# Patient Record
Sex: FEMALE | Race: WHITE | Employment: OTHER | URBAN - METROPOLITAN AREA
[De-identification: names, ages, dates, MRNs, and addresses within clinical notes are randomized per-mention and may not be internally consistent; named-entity substitution may affect disease eponyms.]

---

## 2018-08-14 ENCOUNTER — OFFICE VISIT (OUTPATIENT)
Dept: FAMILY MEDICINE CLINIC | Age: 48
End: 2018-08-14
Payer: MEDICARE

## 2018-08-14 ENCOUNTER — HOSPITAL ENCOUNTER (OUTPATIENT)
Age: 48
Setting detail: SPECIMEN
Discharge: HOME | End: 2018-08-14
Payer: MEDICARE

## 2018-08-14 VITALS
DIASTOLIC BLOOD PRESSURE: 82 MMHG | RESPIRATION RATE: 16 BRPM | HEIGHT: 64 IN | TEMPERATURE: 97.8 F | WEIGHT: 117 LBS | HEART RATE: 70 BPM | SYSTOLIC BLOOD PRESSURE: 128 MMHG | BODY MASS INDEX: 19.97 KG/M2

## 2018-08-14 DIAGNOSIS — Z23 NEED FOR PNEUMOCOCCAL VACCINATION: ICD-10-CM

## 2018-08-14 DIAGNOSIS — F17.200 TOBACCO USE DISORDER: ICD-10-CM

## 2018-08-14 DIAGNOSIS — Z11.4 SCREENING FOR HIV (HUMAN IMMUNODEFICIENCY VIRUS): ICD-10-CM

## 2018-08-14 DIAGNOSIS — G90.A POTS (POSTURAL ORTHOSTATIC TACHYCARDIA SYNDROME): Primary | ICD-10-CM

## 2018-08-14 DIAGNOSIS — Z12.31 ENCOUNTER FOR SCREENING MAMMOGRAM FOR BREAST CANCER: ICD-10-CM

## 2018-08-14 DIAGNOSIS — M25.50 POLYARTHRALGIA: ICD-10-CM

## 2018-08-14 DIAGNOSIS — Z76.89 ENCOUNTER TO ESTABLISH CARE: ICD-10-CM

## 2018-08-14 DIAGNOSIS — G90.A POTS (POSTURAL ORTHOSTATIC TACHYCARDIA SYNDROME): ICD-10-CM

## 2018-08-14 DIAGNOSIS — Z90.710 HISTORY OF HYSTERECTOMY FOR BENIGN DISEASE: ICD-10-CM

## 2018-08-14 DIAGNOSIS — G43.109 OCULAR MIGRAINE: ICD-10-CM

## 2018-08-14 DIAGNOSIS — Z82.49 FAMILY HISTORY OF HEART DISEASE: ICD-10-CM

## 2018-08-14 LAB
ALBUMIN SERPL-MCNC: 4.4 G/DL (ref 3.5–5.2)
ALBUMIN/GLOBULIN RATIO: 1.7 (ref 1–2.5)
ALP BLD-CCNC: 69 U/L (ref 35–104)
ALT SERPL-CCNC: 11 U/L (ref 5–33)
ANION GAP SERPL CALCULATED.3IONS-SCNC: 13 MMOL/L (ref 9–17)
AST SERPL-CCNC: 17 U/L
BILIRUB SERPL-MCNC: 0.53 MG/DL (ref 0.3–1.2)
BUN BLDV-MCNC: 9 MG/DL (ref 6–20)
BUN/CREAT BLD: NORMAL (ref 9–20)
C-REACTIVE PROTEIN: <0.3 MG/L (ref 0–5)
CALCIUM SERPL-MCNC: 9.1 MG/DL (ref 8.6–10.4)
CHLORIDE BLD-SCNC: 104 MMOL/L (ref 98–107)
CHOLESTEROL/HDL RATIO: 2.1
CHOLESTEROL: 187 MG/DL
CO2: 26 MMOL/L (ref 20–31)
CREAT SERPL-MCNC: 0.56 MG/DL (ref 0.5–0.9)
GFR AFRICAN AMERICAN: >60 ML/MIN
GFR NON-AFRICAN AMERICAN: >60 ML/MIN
GFR SERPL CREATININE-BSD FRML MDRD: NORMAL ML/MIN/{1.73_M2}
GFR SERPL CREATININE-BSD FRML MDRD: NORMAL ML/MIN/{1.73_M2}
GLUCOSE FASTING: 79 MG/DL (ref 70–99)
HCT VFR BLD CALC: 46.2 % (ref 36.3–47.1)
HDLC SERPL-MCNC: 88 MG/DL
HEMOGLOBIN: 14.6 G/DL (ref 11.9–15.1)
HIV AG/AB: NONREACTIVE
LDL CHOLESTEROL: 83 MG/DL (ref 0–130)
MCH RBC QN AUTO: 31.7 PG (ref 25.2–33.5)
MCHC RBC AUTO-ENTMCNC: 31.6 G/DL (ref 28.4–34.8)
MCV RBC AUTO: 100.2 FL (ref 82.6–102.9)
NRBC AUTOMATED: 0 PER 100 WBC
PDW BLD-RTO: 13.4 % (ref 11.8–14.4)
PLATELET # BLD: 349 K/UL (ref 138–453)
PMV BLD AUTO: 10.3 FL (ref 8.1–13.5)
POTASSIUM SERPL-SCNC: 3.9 MMOL/L (ref 3.7–5.3)
RBC # BLD: 4.61 M/UL (ref 3.95–5.11)
RHEUMATOID FACTOR: <10 IU/ML
SODIUM BLD-SCNC: 143 MMOL/L (ref 135–144)
TOTAL PROTEIN: 7 G/DL (ref 6.4–8.3)
TRIGL SERPL-MCNC: 81 MG/DL
VLDLC SERPL CALC-MCNC: NORMAL MG/DL (ref 1–30)
WBC # BLD: 8.4 K/UL (ref 3.5–11.3)

## 2018-08-14 PROCEDURE — 90471 IMMUNIZATION ADMIN: CPT | Performed by: NURSE PRACTITIONER

## 2018-08-14 PROCEDURE — G8420 CALC BMI NORM PARAMETERS: HCPCS | Performed by: NURSE PRACTITIONER

## 2018-08-14 PROCEDURE — 90732 PPSV23 VACC 2 YRS+ SUBQ/IM: CPT | Performed by: NURSE PRACTITIONER

## 2018-08-14 PROCEDURE — 4004F PT TOBACCO SCREEN RCVD TLK: CPT | Performed by: NURSE PRACTITIONER

## 2018-08-14 PROCEDURE — 99204 OFFICE O/P NEW MOD 45 MIN: CPT | Performed by: NURSE PRACTITIONER

## 2018-08-14 PROCEDURE — G8427 DOCREV CUR MEDS BY ELIG CLIN: HCPCS | Performed by: NURSE PRACTITIONER

## 2018-08-14 RX ORDER — FLUDROCORTISONE ACETATE 0.1 MG/1
0.1 TABLET ORAL DAILY
Qty: 30 TABLET | Refills: 3 | Status: SHIPPED | OUTPATIENT
Start: 2018-08-14 | End: 2018-10-31 | Stop reason: RX

## 2018-08-14 ASSESSMENT — ENCOUNTER SYMPTOMS
BACK PAIN: 1
ABDOMINAL DISTENTION: 0
WHEEZING: 0
PHOTOPHOBIA: 0
DIARRHEA: 0
RHINORRHEA: 0
VOMITING: 0
SHORTNESS OF BREATH: 0
CHEST TIGHTNESS: 0
ABDOMINAL PAIN: 0
CONSTIPATION: 0
NAUSEA: 0
BLOOD IN STOOL: 0
COUGH: 0
SORE THROAT: 0
EYE PAIN: 0

## 2018-08-14 ASSESSMENT — PATIENT HEALTH QUESTIONNAIRE - PHQ9
SUM OF ALL RESPONSES TO PHQ9 QUESTIONS 1 & 2: 0
2. FEELING DOWN, DEPRESSED OR HOPELESS: 0
1. LITTLE INTEREST OR PLEASURE IN DOING THINGS: 0
SUM OF ALL RESPONSES TO PHQ QUESTIONS 1-9: 0
SUM OF ALL RESPONSES TO PHQ QUESTIONS 1-9: 0

## 2018-08-14 NOTE — PROGRESS NOTES
Uvula is midline and oropharynx is clear and moist. No oropharyngeal exudate or posterior oropharyngeal erythema. Eyes: Pupils are equal, round, and reactive to light. Conjunctivae, EOM and lids are normal. Right eye exhibits no discharge. Left eye exhibits no discharge. No scleral icterus. Neck: Trachea normal and normal range of motion. Neck supple. No neck rigidity. No tracheal deviation, no erythema and normal range of motion present. No thyromegaly present. Cardiovascular: Normal rate, regular rhythm, normal heart sounds and intact distal pulses. No murmur heard. Pulses:       Carotid pulses are 2+ on the right side, and 2+ on the left side. Radial pulses are 2+ on the right side, and 2+ on the left side. Posterior tibial pulses are 2+ on the right side, and 2+ on the left side. Pulmonary/Chest: Effort normal and breath sounds normal. No accessory muscle usage or stridor. No respiratory distress. She has no decreased breath sounds. She has no wheezes. She has no rhonchi. She has no rales. She exhibits no tenderness. Abdominal: Soft. Normal appearance and bowel sounds are normal. She exhibits no distension. There is no tenderness. There is no rigidity, no rebound, no guarding and no CVA tenderness. Musculoskeletal: She exhibits no edema. Right shoulder: She exhibits tenderness. Left shoulder: She exhibits tenderness. Right foot: There is tenderness. Left foot: There is tenderness. Lymphadenopathy:     She has no cervical adenopathy. Neurological: She is alert and oriented to person, place, and time. She has normal strength and normal reflexes. No cranial nerve deficit or sensory deficit. She exhibits normal muscle tone. She displays no seizure activity. Coordination and gait normal. GCS eye subscore is 4. GCS verbal subscore is 5. GCS motor subscore is 6. Skin: Skin is warm, dry and intact. No rash noted. She is not diaphoretic. No erythema. providers. Encouraged healthy diet and daily exercise. Recommend biannual dental visits. Return to office in 1 month for reevaluation and review of test results      Return in about 4 weeks (around 9/11/2018), or if symptoms worsen or fail to improve, for Routine follow up. Rayna received counseling on the following healthy behaviors: nutrition, exercise, medication adherence, tobacco cessation and decrease in alcohol consumption  Reviewed prior labs and health maintenance  Continue current medications, diet and exercise. Discussed use, benefit, and side effects of prescribed medications. Barriers to medication compliance addressed. Patient given educational materials - see patient instructions  Was a self-tracking handout given in paper form or via [employer]? No:     Requested Prescriptions     Signed Prescriptions Disp Refills    fludrocortisone (FLORINEF) 0.1 MG tablet 30 tablet 3     Sig: Take 1 tablet by mouth daily       All patient questions answered. Patient voiced understanding. Quality Measures    Body mass index is 20.08 kg/m². Normal. Weight control planned discussed Healthy diet and regular exercise. BP: 128/82 Blood pressure is normal. Treatment plan consists of No treatment change needed.     No results found for: LDLCALC, LDLCHOLESTEROL, LDLDIRECT (goal LDL reduction with dx if diabetes is 50% LDL reduction)      PHQ Scores 8/14/2018   PHQ2 Score 0   PHQ9 Score 0     Interpretation of Total Score Depression Severity: 1-4 = Minimal depression, 5-9 = Mild depression, 10-14 = Moderate depression, 15-19 = Moderately severe depression, 20-27 = Severe depression          Electronically signed by GERARDO Nelson CNP on 8/14/2018 at 9:09 AM

## 2018-08-14 NOTE — PATIENT INSTRUCTIONS
precancerous polyp removed from your colon or whether your parent, brother, sister, or child has had colon cancer. For women  · Breast exam and mammogram. Talk to your doctor about when you should have a clinical breast exam and a mammogram. Medical experts differ on whether and how often women under 50 should have these tests. Your doctor can help you decide what is right for you. · Pap test and pelvic exam. Begin Pap tests at age 24. A Pap test is the best way to find cervical cancer. The test often is part of a pelvic exam. Ask how often to have this test.  · Tests for sexually transmitted infections (STIs). Ask whether you should have tests for STIs. You may be at risk if you have sex with more than one person, especially if your partners do not wear condoms. For men  · Tests for sexually transmitted infections (STIs). Ask whether you should have tests for STIs. You may be at risk if you have sex with more than one person, especially if you do not wear a condom. · Testicular cancer exam. Ask your doctor whether you should check your testicles regularly. · Prostate exam. Talk to your doctor about whether you should have a blood test (called a PSA test) for prostate cancer. Experts differ on whether and when men should have this test. Some experts suggest it if you are older than 39 and are -American or have a father or brother who got prostate cancer when he was younger than 72. When should you call for help? Watch closely for changes in your health, and be sure to contact your doctor if you have any problems or symptoms that concern you. Where can you learn more? Go to [email or web address] and sign in to your [employer] account. Enter P072 in the ARPU box to learn more about \"Well Visit, Ages 25 to 48: Care Instructions. \"     If you do not have an account, please click on the \"Sign Up Now\" link. Current as of:  May 16, 2017  Content Version: 11.7  © 1349-1282

## 2018-08-15 LAB — ANTI-NUCLEAR ANTIBODY (ANA): NEGATIVE

## 2018-09-10 DIAGNOSIS — G90.A POTS (POSTURAL ORTHOSTATIC TACHYCARDIA SYNDROME): ICD-10-CM

## 2018-09-10 RX ORDER — FLUDROCORTISONE ACETATE 0.1 MG/1
0.1 TABLET ORAL DAILY
Qty: 30 TABLET | Refills: 3 | OUTPATIENT
Start: 2018-09-10 | End: 2019-09-10

## 2018-09-10 NOTE — TELEPHONE ENCOUNTER
From: Josephine Bob  Sent: 9/8/2018 12:17 PM EDT  Subject: Medication Renewal Request    Josephine Bob would like a refill of the following medications:     fludrocortisone (FLORINEF) 0.1 MG tablet Kinjal Wasserman, GERARDO - CNP]    Preferred pharmacy: [facility], [address] [address] [phone] -  [phone]

## 2018-09-12 ENCOUNTER — HOSPITAL ENCOUNTER (OUTPATIENT)
Dept: MAMMOGRAPHY | Age: 48
Discharge: HOME | End: 2018-09-14
Payer: MEDICARE

## 2018-09-12 DIAGNOSIS — Z12.31 ENCOUNTER FOR SCREENING MAMMOGRAM FOR BREAST CANCER: ICD-10-CM

## 2018-09-12 PROCEDURE — 77067 SCR MAMMO BI INCL CAD: CPT

## 2018-09-13 DIAGNOSIS — R92.8 ABNORMAL MAMMOGRAM: Primary | ICD-10-CM

## 2018-09-27 ENCOUNTER — HOSPITAL ENCOUNTER (OUTPATIENT)
Dept: MAMMOGRAPHY | Age: 48
Discharge: HOME | End: 2018-09-29
Payer: MEDICARE

## 2018-09-27 ENCOUNTER — HOSPITAL ENCOUNTER (OUTPATIENT)
Dept: ULTRASOUND IMAGING | Age: 48
Discharge: HOME | End: 2018-09-29
Payer: MEDICARE

## 2018-09-27 DIAGNOSIS — R92.8 ABNORMAL MAMMOGRAM: ICD-10-CM

## 2018-09-27 PROCEDURE — G0279 TOMOSYNTHESIS, MAMMO: HCPCS

## 2018-09-27 PROCEDURE — 76642 ULTRASOUND BREAST LIMITED: CPT

## 2018-09-28 DIAGNOSIS — R92.8 ABNORMAL MAMMOGRAM: Primary | ICD-10-CM

## 2018-10-15 ENCOUNTER — TELEPHONE (OUTPATIENT)
Dept: FAMILY MEDICINE CLINIC | Age: 48
End: 2018-10-15

## 2018-10-15 DIAGNOSIS — F17.200 TOBACCO USE DISORDER: Primary | ICD-10-CM

## 2018-10-15 RX ORDER — VARENICLINE TARTRATE 25 MG
KIT ORAL
Qty: 42 TABLET | Refills: 0 | Status: SHIPPED | OUTPATIENT
Start: 2018-10-15 | End: 2018-11-09 | Stop reason: SDUPTHER

## 2018-10-17 ENCOUNTER — PATIENT MESSAGE (OUTPATIENT)
Dept: FAMILY MEDICINE CLINIC | Age: 48
End: 2018-10-17

## 2018-10-18 ENCOUNTER — PATIENT MESSAGE (OUTPATIENT)
Dept: FAMILY MEDICINE CLINIC | Age: 48
End: 2018-10-18

## 2018-10-18 DIAGNOSIS — G90.A POTS (POSTURAL ORTHOSTATIC TACHYCARDIA SYNDROME): Primary | ICD-10-CM

## 2018-10-19 ENCOUNTER — PATIENT MESSAGE (OUTPATIENT)
Dept: FAMILY MEDICINE CLINIC | Age: 48
End: 2018-10-19

## 2018-10-19 DIAGNOSIS — G90.A POTS (POSTURAL ORTHOSTATIC TACHYCARDIA SYNDROME): Primary | ICD-10-CM

## 2018-10-19 DIAGNOSIS — G43.109 OCULAR MIGRAINE: ICD-10-CM

## 2018-10-19 RX ORDER — METOPROLOL SUCCINATE 25 MG/1
25 TABLET, EXTENDED RELEASE ORAL DAILY
Qty: 30 TABLET | Refills: 3 | Status: SHIPPED | OUTPATIENT
Start: 2018-10-19 | End: 2019-08-15 | Stop reason: ALTCHOICE

## 2018-10-31 ENCOUNTER — OFFICE VISIT (OUTPATIENT)
Dept: FAMILY MEDICINE CLINIC | Age: 48
End: 2018-10-31
Payer: MEDICARE

## 2018-10-31 VITALS
HEART RATE: 52 BPM | DIASTOLIC BLOOD PRESSURE: 82 MMHG | WEIGHT: 116.9 LBS | SYSTOLIC BLOOD PRESSURE: 124 MMHG | RESPIRATION RATE: 20 BRPM | TEMPERATURE: 97.8 F | BODY MASS INDEX: 20.07 KG/M2

## 2018-10-31 DIAGNOSIS — J06.9 UPPER RESPIRATORY TRACT INFECTION, UNSPECIFIED TYPE: Primary | ICD-10-CM

## 2018-10-31 DIAGNOSIS — F17.210 CIGARETTE NICOTINE DEPENDENCE WITHOUT COMPLICATION: ICD-10-CM

## 2018-10-31 PROCEDURE — G8427 DOCREV CUR MEDS BY ELIG CLIN: HCPCS | Performed by: NURSE PRACTITIONER

## 2018-10-31 PROCEDURE — 4004F PT TOBACCO SCREEN RCVD TLK: CPT | Performed by: NURSE PRACTITIONER

## 2018-10-31 PROCEDURE — G8484 FLU IMMUNIZE NO ADMIN: HCPCS | Performed by: NURSE PRACTITIONER

## 2018-10-31 PROCEDURE — 99213 OFFICE O/P EST LOW 20 MIN: CPT | Performed by: NURSE PRACTITIONER

## 2018-10-31 PROCEDURE — G8420 CALC BMI NORM PARAMETERS: HCPCS | Performed by: NURSE PRACTITIONER

## 2018-10-31 RX ORDER — METHYLPREDNISOLONE 4 MG/1
4 TABLET ORAL
Refills: 0 | COMMUNITY
Start: 2018-08-01 | End: 2018-10-31 | Stop reason: ALTCHOICE

## 2018-10-31 RX ORDER — DOXYCYCLINE HYCLATE 100 MG
100 TABLET ORAL 2 TIMES DAILY
Qty: 20 TABLET | Refills: 0 | Status: SHIPPED | OUTPATIENT
Start: 2018-10-31 | End: 2018-11-10

## 2018-10-31 ASSESSMENT — ENCOUNTER SYMPTOMS
SHORTNESS OF BREATH: 0
COUGH: 1
EYE DISCHARGE: 0
EYE REDNESS: 0
ABDOMINAL PAIN: 0
EYE ITCHING: 0
CONSTIPATION: 0
SORE THROAT: 0
NAUSEA: 0
RHINORRHEA: 1
DIARRHEA: 0

## 2018-11-09 DIAGNOSIS — F17.200 TOBACCO USE DISORDER: Primary | ICD-10-CM

## 2018-11-09 RX ORDER — VARENICLINE TARTRATE 25 MG
KIT ORAL
Qty: 42 TABLET | Refills: 0 | Status: CANCELLED | OUTPATIENT
Start: 2018-11-09 | End: 2019-11-09

## 2018-11-09 RX ORDER — VARENICLINE TARTRATE 1 MG/1
1 TABLET, FILM COATED ORAL 2 TIMES DAILY
Qty: 60 TABLET | Refills: 1 | Status: SHIPPED | OUTPATIENT
Start: 2018-11-09 | End: 2019-04-04

## 2019-02-05 ENCOUNTER — HOSPITAL ENCOUNTER (OUTPATIENT)
Dept: GENERAL RADIOLOGY | Facility: CLINIC | Age: 49
Discharge: HOME | End: 2019-02-07
Payer: MEDICARE

## 2019-02-05 ENCOUNTER — HOSPITAL ENCOUNTER (OUTPATIENT)
Facility: CLINIC | Age: 49
Discharge: HOME | End: 2019-02-07
Payer: MEDICARE

## 2019-02-05 ENCOUNTER — OFFICE VISIT (OUTPATIENT)
Dept: FAMILY MEDICINE CLINIC | Age: 49
End: 2019-02-05
Payer: MEDICARE

## 2019-02-05 VITALS
RESPIRATION RATE: 16 BRPM | DIASTOLIC BLOOD PRESSURE: 76 MMHG | SYSTOLIC BLOOD PRESSURE: 122 MMHG | HEART RATE: 68 BPM | WEIGHT: 124 LBS | TEMPERATURE: 98.9 F | BODY MASS INDEX: 21.28 KG/M2

## 2019-02-05 DIAGNOSIS — M62.838 SPASM OF CERVICAL PARASPINOUS MUSCLE: ICD-10-CM

## 2019-02-05 DIAGNOSIS — R20.0 NUMBNESS: ICD-10-CM

## 2019-02-05 DIAGNOSIS — Z23 NEED FOR TDAP VACCINATION: ICD-10-CM

## 2019-02-05 DIAGNOSIS — M62.838 SPASM OF CERVICAL PARASPINOUS MUSCLE: Primary | ICD-10-CM

## 2019-02-05 PROCEDURE — G8427 DOCREV CUR MEDS BY ELIG CLIN: HCPCS | Performed by: NURSE PRACTITIONER

## 2019-02-05 PROCEDURE — 99213 OFFICE O/P EST LOW 20 MIN: CPT | Performed by: NURSE PRACTITIONER

## 2019-02-05 PROCEDURE — G8420 CALC BMI NORM PARAMETERS: HCPCS | Performed by: NURSE PRACTITIONER

## 2019-02-05 PROCEDURE — 90715 TDAP VACCINE 7 YRS/> IM: CPT | Performed by: NURSE PRACTITIONER

## 2019-02-05 PROCEDURE — 72040 X-RAY EXAM NECK SPINE 2-3 VW: CPT

## 2019-02-05 PROCEDURE — 90471 IMMUNIZATION ADMIN: CPT | Performed by: NURSE PRACTITIONER

## 2019-02-05 PROCEDURE — 4004F PT TOBACCO SCREEN RCVD TLK: CPT | Performed by: NURSE PRACTITIONER

## 2019-02-05 PROCEDURE — G8484 FLU IMMUNIZE NO ADMIN: HCPCS | Performed by: NURSE PRACTITIONER

## 2019-02-05 RX ORDER — TIZANIDINE 4 MG/1
4 TABLET ORAL 3 TIMES DAILY PRN
Qty: 30 TABLET | Refills: 0 | Status: SHIPPED | OUTPATIENT
Start: 2019-02-05 | End: 2019-02-18 | Stop reason: ALTCHOICE

## 2019-02-05 RX ORDER — NAPROXEN 500 MG/1
500 TABLET ORAL 2 TIMES DAILY WITH MEALS
Qty: 60 TABLET | Refills: 3 | Status: SHIPPED | OUTPATIENT
Start: 2019-02-05 | End: 2019-08-15 | Stop reason: ALTCHOICE

## 2019-02-05 ASSESSMENT — PATIENT HEALTH QUESTIONNAIRE - PHQ9
SUM OF ALL RESPONSES TO PHQ QUESTIONS 1-9: 0
2. FEELING DOWN, DEPRESSED OR HOPELESS: 0
1. LITTLE INTEREST OR PLEASURE IN DOING THINGS: 0
SUM OF ALL RESPONSES TO PHQ9 QUESTIONS 1 & 2: 0
SUM OF ALL RESPONSES TO PHQ QUESTIONS 1-9: 0

## 2019-02-05 ASSESSMENT — ENCOUNTER SYMPTOMS
NAUSEA: 0
WHEEZING: 0
DIARRHEA: 0
SORE THROAT: 0
CONSTIPATION: 0
RESPIRATORY NEGATIVE: 1
CHEST TIGHTNESS: 0
COUGH: 0
GASTROINTESTINAL NEGATIVE: 1
RHINORRHEA: 0
BACK PAIN: 0
EYES NEGATIVE: 1
SHORTNESS OF BREATH: 0
ABDOMINAL PAIN: 0
VOMITING: 0

## 2019-02-18 ENCOUNTER — OFFICE VISIT (OUTPATIENT)
Dept: FAMILY MEDICINE CLINIC | Age: 49
End: 2019-02-18
Payer: MEDICARE

## 2019-02-18 VITALS
RESPIRATION RATE: 16 BRPM | BODY MASS INDEX: 20.6 KG/M2 | SYSTOLIC BLOOD PRESSURE: 120 MMHG | DIASTOLIC BLOOD PRESSURE: 72 MMHG | HEART RATE: 86 BPM | WEIGHT: 120 LBS | TEMPERATURE: 97.9 F

## 2019-02-18 DIAGNOSIS — M62.838 CERVICAL PARASPINAL MUSCLE SPASM: Primary | ICD-10-CM

## 2019-02-18 PROCEDURE — 4004F PT TOBACCO SCREEN RCVD TLK: CPT | Performed by: NURSE PRACTITIONER

## 2019-02-18 PROCEDURE — G8420 CALC BMI NORM PARAMETERS: HCPCS | Performed by: NURSE PRACTITIONER

## 2019-02-18 PROCEDURE — 99213 OFFICE O/P EST LOW 20 MIN: CPT | Performed by: NURSE PRACTITIONER

## 2019-02-18 PROCEDURE — G8484 FLU IMMUNIZE NO ADMIN: HCPCS | Performed by: NURSE PRACTITIONER

## 2019-02-18 PROCEDURE — G8427 DOCREV CUR MEDS BY ELIG CLIN: HCPCS | Performed by: NURSE PRACTITIONER

## 2019-02-18 RX ORDER — GABAPENTIN 100 MG/1
100 CAPSULE ORAL NIGHTLY
Qty: 30 CAPSULE | Refills: 0 | Status: SHIPPED | OUTPATIENT
Start: 2019-02-18 | End: 2019-06-12 | Stop reason: SDUPTHER

## 2019-02-18 RX ORDER — CYCLOBENZAPRINE HCL 5 MG
5-10 TABLET ORAL 2 TIMES DAILY PRN
Qty: 30 TABLET | Refills: 0 | Status: SHIPPED | OUTPATIENT
Start: 2019-02-18 | End: 2019-02-28

## 2019-02-18 ASSESSMENT — ENCOUNTER SYMPTOMS
ABDOMINAL PAIN: 0
BACK PAIN: 0
SORE THROAT: 0
DIARRHEA: 0
CONSTIPATION: 0
RHINORRHEA: 0
PHOTOPHOBIA: 0
BLOOD IN STOOL: 0
COUGH: 0
VOMITING: 0
ABDOMINAL DISTENTION: 0
CHEST TIGHTNESS: 0
WHEEZING: 0
SHORTNESS OF BREATH: 0
NAUSEA: 0
EYE PAIN: 0

## 2019-02-20 ENCOUNTER — HOSPITAL ENCOUNTER (OUTPATIENT)
Dept: PHYSICAL THERAPY | Facility: CLINIC | Age: 49
Setting detail: THERAPIES SERIES
Discharge: HOME | End: 2019-02-20
Payer: MEDICARE

## 2019-02-20 PROCEDURE — 97012 MECHANICAL TRACTION THERAPY: CPT

## 2019-02-20 PROCEDURE — 97110 THERAPEUTIC EXERCISES: CPT

## 2019-02-20 PROCEDURE — 97161 PT EVAL LOW COMPLEX 20 MIN: CPT

## 2019-02-21 ENCOUNTER — HOSPITAL ENCOUNTER (OUTPATIENT)
Dept: PHYSICAL THERAPY | Facility: CLINIC | Age: 49
Setting detail: THERAPIES SERIES
Discharge: HOME | End: 2019-02-21
Payer: MEDICARE

## 2019-02-21 PROCEDURE — 97012 MECHANICAL TRACTION THERAPY: CPT

## 2019-02-21 PROCEDURE — 97110 THERAPEUTIC EXERCISES: CPT

## 2019-02-27 ENCOUNTER — HOSPITAL ENCOUNTER (OUTPATIENT)
Dept: PHYSICAL THERAPY | Facility: CLINIC | Age: 49
Setting detail: THERAPIES SERIES
Discharge: HOME | End: 2019-02-27
Payer: MEDICARE

## 2019-02-27 PROCEDURE — 97140 MANUAL THERAPY 1/> REGIONS: CPT

## 2019-02-27 PROCEDURE — 97110 THERAPEUTIC EXERCISES: CPT

## 2019-02-27 PROCEDURE — 97012 MECHANICAL TRACTION THERAPY: CPT

## 2019-02-28 ENCOUNTER — HOSPITAL ENCOUNTER (OUTPATIENT)
Dept: PHYSICAL THERAPY | Facility: CLINIC | Age: 49
Setting detail: THERAPIES SERIES
Discharge: HOME | End: 2019-02-28
Payer: MEDICARE

## 2019-02-28 PROCEDURE — 97012 MECHANICAL TRACTION THERAPY: CPT

## 2019-02-28 PROCEDURE — 97110 THERAPEUTIC EXERCISES: CPT

## 2019-02-28 PROCEDURE — 97140 MANUAL THERAPY 1/> REGIONS: CPT

## 2019-03-06 ENCOUNTER — APPOINTMENT (OUTPATIENT)
Dept: PHYSICAL THERAPY | Facility: CLINIC | Age: 49
End: 2019-03-06
Payer: MEDICARE

## 2019-03-07 ENCOUNTER — HOSPITAL ENCOUNTER (OUTPATIENT)
Dept: PHYSICAL THERAPY | Facility: CLINIC | Age: 49
Setting detail: THERAPIES SERIES
Discharge: HOME | End: 2019-03-07
Payer: MEDICARE

## 2019-03-13 ENCOUNTER — HOSPITAL ENCOUNTER (OUTPATIENT)
Dept: PHYSICAL THERAPY | Facility: CLINIC | Age: 49
Setting detail: THERAPIES SERIES
Discharge: HOME | End: 2019-03-13
Payer: MEDICARE

## 2019-03-13 PROCEDURE — 97140 MANUAL THERAPY 1/> REGIONS: CPT

## 2019-03-13 PROCEDURE — 97012 MECHANICAL TRACTION THERAPY: CPT

## 2019-03-13 PROCEDURE — 97110 THERAPEUTIC EXERCISES: CPT

## 2019-03-15 ENCOUNTER — HOSPITAL ENCOUNTER (OUTPATIENT)
Dept: PHYSICAL THERAPY | Facility: CLINIC | Age: 49
Setting detail: THERAPIES SERIES
Discharge: HOME | End: 2019-03-15
Payer: MEDICARE

## 2019-03-15 PROCEDURE — 97012 MECHANICAL TRACTION THERAPY: CPT

## 2019-03-15 PROCEDURE — 97140 MANUAL THERAPY 1/> REGIONS: CPT

## 2019-03-15 PROCEDURE — 97032 APPL MODALITY 1+ESTIM EA 15: CPT

## 2019-03-15 PROCEDURE — G0283 ELEC STIM OTHER THAN WOUND: HCPCS

## 2019-03-15 PROCEDURE — 97110 THERAPEUTIC EXERCISES: CPT

## 2019-03-20 ENCOUNTER — HOSPITAL ENCOUNTER (OUTPATIENT)
Dept: PHYSICAL THERAPY | Facility: CLINIC | Age: 49
Setting detail: THERAPIES SERIES
Discharge: HOME | End: 2019-03-20
Payer: MEDICARE

## 2019-03-20 PROCEDURE — 97110 THERAPEUTIC EXERCISES: CPT

## 2019-03-20 PROCEDURE — 97012 MECHANICAL TRACTION THERAPY: CPT

## 2019-03-20 PROCEDURE — G0283 ELEC STIM OTHER THAN WOUND: HCPCS

## 2019-03-22 ENCOUNTER — TELEPHONE (OUTPATIENT)
Dept: FAMILY MEDICINE CLINIC | Age: 49
End: 2019-03-22

## 2019-03-22 ENCOUNTER — HOSPITAL ENCOUNTER (OUTPATIENT)
Dept: PHYSICAL THERAPY | Facility: CLINIC | Age: 49
Setting detail: THERAPIES SERIES
Discharge: HOME | End: 2019-03-22
Payer: MEDICARE

## 2019-03-22 DIAGNOSIS — M54.2 NECK PAIN: Primary | ICD-10-CM

## 2019-03-22 PROCEDURE — 97012 MECHANICAL TRACTION THERAPY: CPT

## 2019-03-22 PROCEDURE — 97110 THERAPEUTIC EXERCISES: CPT

## 2019-03-22 PROCEDURE — 97140 MANUAL THERAPY 1/> REGIONS: CPT

## 2019-03-27 ENCOUNTER — HOSPITAL ENCOUNTER (OUTPATIENT)
Dept: PHYSICAL THERAPY | Facility: CLINIC | Age: 49
Setting detail: THERAPIES SERIES
Discharge: HOME | End: 2019-03-27
Payer: MEDICARE

## 2019-03-29 ENCOUNTER — APPOINTMENT (OUTPATIENT)
Dept: PHYSICAL THERAPY | Facility: CLINIC | Age: 49
End: 2019-03-29
Payer: MEDICARE

## 2019-04-01 ENCOUNTER — HOSPITAL ENCOUNTER (OUTPATIENT)
Dept: MRI IMAGING | Facility: CLINIC | Age: 49
Discharge: HOME | End: 2019-04-03
Payer: MEDICARE

## 2019-04-01 DIAGNOSIS — M54.2 NECK PAIN: ICD-10-CM

## 2019-04-01 PROCEDURE — 72141 MRI NECK SPINE W/O DYE: CPT

## 2019-04-02 DIAGNOSIS — R93.7 ABNORMAL MRI, CERVICAL SPINE: Primary | ICD-10-CM

## 2019-04-04 ENCOUNTER — OFFICE VISIT (OUTPATIENT)
Dept: NEUROSURGERY | Age: 49
End: 2019-04-04
Payer: MEDICARE

## 2019-04-04 VITALS
WEIGHT: 119 LBS | HEART RATE: 78 BPM | BODY MASS INDEX: 19.83 KG/M2 | HEIGHT: 65 IN | SYSTOLIC BLOOD PRESSURE: 130 MMHG | DIASTOLIC BLOOD PRESSURE: 84 MMHG

## 2019-04-04 DIAGNOSIS — R20.2 NUMBNESS AND TINGLING IN RIGHT HAND: ICD-10-CM

## 2019-04-04 DIAGNOSIS — M47.22 CERVICAL SPONDYLOSIS WITH RADICULOPATHY: Primary | ICD-10-CM

## 2019-04-04 DIAGNOSIS — R20.0 NUMBNESS AND TINGLING IN RIGHT HAND: ICD-10-CM

## 2019-04-04 PROCEDURE — G8420 CALC BMI NORM PARAMETERS: HCPCS | Performed by: PHYSICIAN ASSISTANT

## 2019-04-04 PROCEDURE — 99242 OFF/OP CONSLTJ NEW/EST SF 20: CPT | Performed by: PHYSICIAN ASSISTANT

## 2019-04-04 PROCEDURE — G8427 DOCREV CUR MEDS BY ELIG CLIN: HCPCS | Performed by: PHYSICIAN ASSISTANT

## 2019-04-04 RX ORDER — CYCLOBENZAPRINE HCL 10 MG
5 TABLET ORAL 2 TIMES DAILY PRN
Status: ON HOLD | COMMUNITY
End: 2019-08-30 | Stop reason: HOSPADM

## 2019-04-04 RX ORDER — PREDNISONE 20 MG/1
TABLET ORAL
Qty: 18 TABLET | Refills: 0 | Status: SHIPPED | OUTPATIENT
Start: 2019-04-04 | End: 2019-08-15 | Stop reason: ALTCHOICE

## 2019-04-04 NOTE — PROGRESS NOTES
SECTION      x2    HYSTERECTOMY      ovaries remain     Family History   Problem Relation Age of Onset   Mitchell Carry Cancer Mother         brain    Hypertension Father     Cancer Maternal Grandmother     Heart Disease Maternal Grandfather      Current Outpatient Medications on File Prior to Visit   Medication Sig Dispense Refill    cyclobenzaprine (FLEXERIL) 10 MG tablet Take 10 mg by mouth 3 times daily as needed for Muscle spasms      naproxen (NAPROSYN) 500 MG tablet Take 1 tablet by mouth 2 times daily (with meals) 60 tablet 3    metoprolol succinate (TOPROL XL) 25 MG extended release tablet Take 1 tablet by mouth daily 30 tablet 3    gabapentin (NEURONTIN) 100 MG capsule Take 1 capsule by mouth nightly for 30 days. Intended supply: 30 days. 30 capsule 0     No current facility-administered medications on file prior to visit. Social History     Tobacco Use    Smoking status: Current Some Day Smoker     Packs/day: 1.00     Years: 30.00     Pack years: 30.00    Smokeless tobacco: Never Used   Substance Use Topics    Alcohol use: Yes     Alcohol/week: 7.2 oz     Types: 12 Standard drinks or equivalent per week     Comment: ocassional    Drug use: Yes     Types: Marijuana     Comment: ocassional     Allergies   Allergen Reactions    Diclofenac Swelling    Prozac [Fluoxetine Hcl]        Review of Systems  Constitutional: Negative for activity change and appetite change. HEENT: Negative for ear pain and facial swelling. Eyes: Negative for discharge and itching. Respiratory: Negative for choking and chest tightness. Cardiovascular: Negative for chest pain and leg swelling. Gastrointestinal: Negative for nausea and abdominal pain. Endocrine: Negative for cold intolerance and heat intolerance. Genitourinary: Negative for frequency and flank pain. Musculoskeletal: Negative for myalgias and joint swelling. Skin: Negative for rash and wound.    Allergic/Immunologic: Negative for environmental allergies and food allergies. Hematological: Negative for adenopathy. Does not bruise/bleed easily. Psychiatric/Behavioral: Negative for self-injury. The patient is not nervous/anxious. Physical Exam:      /84   Pulse 78   Ht 5' 5\" (1.651 m)   Wt 119 lb (54 kg)   BMI 19.80 kg/m²   Estimated body mass index is 19.8 kg/m² as calculated from the following:    Height as of this encounter: 5' 5\" (1.651 m). Weight as of this encounter: 119 lb (54 kg). General:  Larry Hutchinson is a 52y.o. year old female who appears her stated age. HEENT: Normocephalic atraumatic. Neck supple. Chest: Clear to auscultation bilaterally. Regular rate and rhythm. Abdomen: Soft nontender nondistended. Normoactive bowel sounds. Neurological Exam  Alert and oriented x 3. CN II-XII intact. Motor examination:   Tender to palpation over posterior cervical spine and paraspinal musculature. Tinel's negative bilaterally to elbow and wrist.  Strength in right upper extremity full and symmetric at 5/5 deltoid, triceps, biceps, wrist ext/flex, and +4/5 . Strength in left upper extremity full and symmetric at 5/5 deltoid, triceps, biceps, wrist ext/flex, and . Strength right lower extremity full and symmetric at 5/5 iliopsoas, quadriceps, hamstring, tibialis anterior, gastrocnemius, and EHL. Strength left lower extremity full and symmetric at 5/5 iliopsoas, quadriceps, hamstring, tibialis anterior, gastrocnemius, and EHL. Sensory: Grossly intact. Reflexes: +2/4 in bilateral upper extremities. +2/4 in bilateral patellar and achilles. Hoffmans negative, no clonus. Gait: Normal, narrow based.     Studies Review:     Reviewed MRI Type:  Film and Report    Images:  Narrative   EXAMINATION:   MRI OF THE CERVICAL SPINE WITHOUT CONTRAST 4/1/2019 10:50 am       TECHNIQUE:   Multiplanar multisequence MRI of the cervical spine was performed without the   administration of intravenous contrast.       COMPARISON: None.       HISTORY:   ORDERING SYSTEM PROVIDED HISTORY: Neck pain   TECHNOLOGIST PROVIDED HISTORY:   neck pain-failed PT   Ordering Physician Provided Reason for Exam: neck pain failed pt   Acuity: Unknown   Type of Exam: Initial   Additional signs and symptoms: neck pain down right arm, worse when sleeping       FINDINGS:   BONES/ALIGNMENT: There is straightening of the cervical spine.  The vertebral   body heights are maintained.  There is age-appropriate bone marrow signal.   There is degenerative endplate change at the E6-0 level.  There is multilevel   degenerative disc disease evidenced by loss of disc signal.  There is disc   space narrowing most pronounced at the C5-6 and C6-7 levels.  There is no   spondylolisthesis.       SPINAL CORD: The spinal cord is normal in caliber and signal without acute or   chronic compression.  The visualized intracranial structures are unremarkable.       SOFT TISSUES: Posterior paraspinal soft tissues are unremarkable.  The   visualized prevertebral soft tissues are unremarkable.       C2-C3: There is a disc osteophyte complex with uncovertebral and facet   hypertrophy.  There is no canal stenosis or left foraminal narrowing.  There   is moderate right foraminal narrowing.       C3-C4: There is a disc osteophyte complex with uncovertebral and facet   hypertrophy.  There is no canal stenosis.  There is moderate to severe left   and severe right foraminal narrowing.       C4-C5: There is a disc osteophyte complex with uncovertebral and facet   hypertrophy.  There is no canal stenosis or significant right foraminal   narrowing.  There is moderate left foraminal narrowing.       C5-C6: There is a disc osteophyte complex with uncovertebral and facet   hypertrophy.  There is canal stenosis measuring 6 mm in AP dimension.  There   is severe bilateral foraminal narrowing.       C6-C7: There is a disc osteophyte complex with uncovertebral and facet   hypertrophy.  There is canal stenosis measuring 8 mm in AP dimension.  There   is severe bilateral foraminal narrowing.       C7-T1: There is no disc bulge or herniation.  There is no canal stenosis or   foraminal narrowing.           Impression   Multilevel degenerative disc disease with uncovertebral and facet   hypertrophy.  There is canal stenosis at C5-6 and C6-7.       Moderate right foraminal narrowing at C2-3, moderate to severe left and   severe right foraminal narrowing at C3-4, moderate left foraminal narrowing   at C4-5, severe bilateral foraminal narrowing at C5-6, as well as severe   bilateral foraminal narrowing at C6-7. Assessment and Plan:     Edel Manley was seen today for consultation, neck pain, arm pain and numbness. Diagnoses and all orders for this visit:    Cervical spondylosis with radiculopathy  -     predniSONE (DELTASONE) 20 MG tablet; Take 3 tabs daily x 3 days, take 2 tabs daily x 3 days, then take 1 tab daily x 3 days  -     EMG; Future    Numbness and tingling in right hand  -     predniSONE (DELTASONE) 20 MG tablet; Take 3 tabs daily x 3 days, take 2 tabs daily x 3 days, then take 1 tab daily x 3 days  -     EMG; Future      Plan:   Ms. Marin Batista is a 52 y.o. female on whom a neurosurgical consultation has been requested for progressively worsening neck pain and neuropathic symptoms in bilateral upper extremities, R>L, since January. MRI cervical spine completed on 4/1/2019 reveals multilevel degenerative disc disease with uncovertebral and facet hypertrophy. There is significant central canal stenosis at level C5/6 and into a lesser degree at C6/7. There is multilevel bilateral neural foraminal stenosis which appears to be most severe at levels C5/6 and C6/7. Aside from a slightly decreased  strength of the right hand physical examination shows patient to be neurologically intact without focal deficit. Denies any symptoms of myelopathy including unsteady gait or loss of hand dexterity.   She is not hyperreflexic nor is her Marin sign positive. Recommendation would be to continue conservative treatment measures including therapy for which she has already been referred. EMG BUE is recommended to further evaluate and define neuropathic symptoms. Will do a 9 day steroid taper for purpose of decreasing inflammation and possibly providing relief of radicular symptoms of upper extremities. Diagnosis and plan discussed with the patient and all questions answered. Followup: Return for with Dr Rocio Melissa, cervical stenosis, s/p EMG. Prescriptions Ordered:  Orders Placed This Encounter   Medications    predniSONE (DELTASONE) 20 MG tablet     Sig: Take 3 tabs daily x 3 days, take 2 tabs daily x 3 days, then take 1 tab daily x 3 days     Dispense:  18 tablet     Refill:  0        Orders Placed:  Orders Placed This Encounter   Procedures    EMG     Standing Status:   Future     Number of Occurrences:   1     Standing Expiration Date:   7/3/2019     Order Specific Question:   Which body part? Answer:   Bilateral Upper Extremities       Electronically signed by MYRA Shah on 5/31/2019 at 2:18 PM    Please note that this chart was generated using voice recognition Dragon dictation software. Although every effort was made to ensure the accuracy of this automated transcription, some errors in transcription may have occurred.

## 2019-04-15 ENCOUNTER — TELEPHONE (OUTPATIENT)
Dept: NEUROSURGERY | Age: 49
End: 2019-04-15

## 2019-04-15 NOTE — TELEPHONE ENCOUNTER
Patient complaining of symptoms of numbness in lower back, right side; spasms lower back, right side; muscle cramping back right leg, behind knee; . (NO PAIN currently)  This has persisted for 7 days  This occurred bending over doing laundry  Are there any known injuries to the area unknown  Is this related to a sugery No  Pain scale 1-10: 0  Incontinence of bowel or bladder No  Radiation of pain down arm or leg No  Other symptoms such as fever or chills No  Did anything help with the pain: none, medications? Did anything make pain worse: ambulation; bending, twisting, reaching    Patient completed steroid taper from 4/4/19 visit.

## 2019-05-14 ENCOUNTER — HOSPITAL ENCOUNTER (OUTPATIENT)
Dept: NEUROLOGY | Age: 49
Discharge: HOME | End: 2019-05-14
Payer: MEDICARE

## 2019-05-14 ENCOUNTER — CLINICAL DOCUMENTATION (OUTPATIENT)
Dept: PHYSICAL MEDICINE AND REHAB | Age: 49
End: 2019-05-14

## 2019-05-14 DIAGNOSIS — M47.22 CERVICAL SPONDYLOSIS WITH RADICULOPATHY: ICD-10-CM

## 2019-05-14 DIAGNOSIS — R20.0 NUMBNESS AND TINGLING IN RIGHT HAND: ICD-10-CM

## 2019-05-14 DIAGNOSIS — R20.2 NUMBNESS AND TINGLING IN RIGHT HAND: ICD-10-CM

## 2019-05-14 PROCEDURE — 95886 MUSC TEST DONE W/N TEST COMP: CPT | Performed by: PHYSICAL MEDICINE & REHABILITATION

## 2019-05-14 PROCEDURE — 95911 NRV CNDJ TEST 9-10 STUDIES: CPT | Performed by: PHYSICAL MEDICINE & REHABILITATION

## 2019-05-14 NOTE — PROGRESS NOTES
Pt came in for testing today.   I accompanied the patient, a family member and dr. Matias Cason into the exam room and stayed for the duration of testing

## 2019-05-28 ENCOUNTER — TELEPHONE (OUTPATIENT)
Dept: NEUROSURGERY | Age: 49
End: 2019-05-28

## 2019-05-28 NOTE — TELEPHONE ENCOUNTER
Patient called would like to know results of EMG . Results scanned in please review and advise.  patient advise will return call in the next 24-48hrs

## 2019-06-04 NOTE — TELEPHONE ENCOUNTER
Pt called again requesting EMG results. Pt called last wk and spoke with Dagmar and original note was placed but no message has been received. Pt states she is still having the issues and wants to know if theres anything wrong. Please review and advise.

## 2019-06-05 NOTE — TELEPHONE ENCOUNTER
EMG indicates degenerative cervical spine is likely cause neuropathic symptoms in her arms. Is she scheduled to se Dr Danny Mauro?

## 2019-06-11 ENCOUNTER — TELEPHONE (OUTPATIENT)
Dept: NEUROLOGY | Age: 49
End: 2019-06-11

## 2019-06-11 NOTE — TELEPHONE ENCOUNTER
This was addressed on 6/5/19 though patient was not called. See encounter. She does need an appointment with Dr Philip Saavedra. I did speak with PCP.   Thank You

## 2019-06-11 NOTE — TELEPHONE ENCOUNTER
Pt had EMG that is  Now reported in system. PCP from [facility] has called inquiring to speak with you regarding results. Her name is Johann Hampton and can be reached at [phone] Option 3. Please call as they keep calling but now results are finally in. Thank you.

## 2019-06-12 ENCOUNTER — HOSPITAL ENCOUNTER (OUTPATIENT)
Facility: CLINIC | Age: 49
Discharge: HOME | End: 2019-06-14
Payer: MEDICARE

## 2019-06-12 ENCOUNTER — HOSPITAL ENCOUNTER (OUTPATIENT)
Age: 49
Setting detail: SPECIMEN
Discharge: HOME | End: 2019-06-12
Payer: MEDICARE

## 2019-06-12 ENCOUNTER — OFFICE VISIT (OUTPATIENT)
Dept: FAMILY MEDICINE CLINIC | Age: 49
End: 2019-06-12
Payer: MEDICARE

## 2019-06-12 ENCOUNTER — HOSPITAL ENCOUNTER (OUTPATIENT)
Dept: GENERAL RADIOLOGY | Facility: CLINIC | Age: 49
Discharge: HOME | End: 2019-06-14
Payer: MEDICARE

## 2019-06-12 VITALS
SYSTOLIC BLOOD PRESSURE: 122 MMHG | RESPIRATION RATE: 16 BRPM | TEMPERATURE: 98.2 F | WEIGHT: 121 LBS | BODY MASS INDEX: 20.14 KG/M2 | DIASTOLIC BLOOD PRESSURE: 76 MMHG | HEART RATE: 84 BPM

## 2019-06-12 DIAGNOSIS — R31.9 HEMATURIA, UNSPECIFIED TYPE: ICD-10-CM

## 2019-06-12 DIAGNOSIS — R10.9 FLANK PAIN: Primary | ICD-10-CM

## 2019-06-12 DIAGNOSIS — M62.838 CERVICAL PARASPINAL MUSCLE SPASM: ICD-10-CM

## 2019-06-12 DIAGNOSIS — R10.9 FLANK PAIN: ICD-10-CM

## 2019-06-12 LAB
BILIRUBIN, POC: NORMAL
BLOOD URINE, POC: NORMAL
CLARITY, POC: NORMAL
COLOR, POC: NORMAL
GLUCOSE URINE, POC: NEGATIVE
KETONES, POC: NEGATIVE
LEUKOCYTE EST, POC: NEGATIVE
NITRITE, POC: NEGATIVE
PH, POC: 6.5
PROTEIN, POC: NEGATIVE
SPECIFIC GRAVITY, POC: 1.02
UROBILINOGEN, POC: 0.2

## 2019-06-12 PROCEDURE — 4004F PT TOBACCO SCREEN RCVD TLK: CPT | Performed by: NURSE PRACTITIONER

## 2019-06-12 PROCEDURE — 74018 RADEX ABDOMEN 1 VIEW: CPT

## 2019-06-12 PROCEDURE — G8427 DOCREV CUR MEDS BY ELIG CLIN: HCPCS | Performed by: NURSE PRACTITIONER

## 2019-06-12 PROCEDURE — G8420 CALC BMI NORM PARAMETERS: HCPCS | Performed by: NURSE PRACTITIONER

## 2019-06-12 PROCEDURE — 99214 OFFICE O/P EST MOD 30 MIN: CPT | Performed by: NURSE PRACTITIONER

## 2019-06-12 RX ORDER — GABAPENTIN 100 MG/1
100 CAPSULE ORAL NIGHTLY
Qty: 30 CAPSULE | Refills: 0 | Status: ON HOLD | OUTPATIENT
Start: 2019-06-12 | End: 2019-08-30

## 2019-06-12 RX ORDER — BACLOFEN 10 MG/1
10 TABLET ORAL 3 TIMES DAILY
Qty: 30 TABLET | Refills: 1 | Status: ON HOLD | OUTPATIENT
Start: 2019-06-12 | End: 2019-08-30 | Stop reason: HOSPADM

## 2019-06-12 NOTE — PATIENT INSTRUCTIONS
Patient Education        Flank Pain: Care Instructions  Your Care Instructions  Flank pain is pain on the side of the back just below the rib cage and above the waist. It can be on one or both sides. Flank pain has many possible causes, including a kidney stone, a urinary tract infection, or back strain. Flank pain may get better on its own. But don't ignore new symptoms, such as fever, nausea and vomiting, urination problems, pain that gets worse, and dizziness. These may be signs of a more serious problem. You may have to have tests or other treatment. Follow-up care is a key part of your treatment and safety. Be sure to make and go to all appointments, and call your doctor if you are having problems. It's also a good idea to know your test results and keep a list of the medicines you take. How can you care for yourself at home? · Rest until you feel better. · Take pain medicines exactly as directed. ? If the doctor gave you a prescription medicine for pain, take it as prescribed. ? If you are not taking a prescription pain medicine, ask your doctor if you can take an over-the-counter pain medicine, such as acetaminophen (Tylenol), ibuprofen (Advil, Motrin), or naproxen (Aleve). Read and follow all instructions on the label. · If your doctor prescribed antibiotics, take them as directed. Do not stop taking them just because you feel better. You need to take the full course of antibiotics. · To apply heat, put a warm water bottle, a heating pad set on low, or a warm cloth on the painful area. Do not go to sleep with a heating pad on your skin. · To prevent dehydration, drink plenty of fluids, enough so that your urine is light yellow or clear like water. Choose water and other caffeine-free clear liquids until you feel better. If you have kidney, heart, or liver disease and have to limit fluids, talk with your doctor before you increase the amount of fluids you drink.   When should you call for

## 2019-06-13 LAB
CULTURE: NO GROWTH
Lab: NORMAL
SPECIMEN DESCRIPTION: NORMAL

## 2019-06-17 ENCOUNTER — OFFICE VISIT (OUTPATIENT)
Dept: NEUROSURGERY | Age: 49
End: 2019-06-17
Payer: MEDICARE

## 2019-06-17 VITALS
SYSTOLIC BLOOD PRESSURE: 140 MMHG | WEIGHT: 120 LBS | DIASTOLIC BLOOD PRESSURE: 91 MMHG | HEART RATE: 72 BPM | BODY MASS INDEX: 19.97 KG/M2

## 2019-06-17 DIAGNOSIS — M47.22 CERVICAL SPONDYLOSIS WITH RADICULOPATHY: Primary | ICD-10-CM

## 2019-06-17 PROCEDURE — G8420 CALC BMI NORM PARAMETERS: HCPCS | Performed by: NEUROLOGICAL SURGERY

## 2019-06-17 PROCEDURE — 99214 OFFICE O/P EST MOD 30 MIN: CPT | Performed by: NEUROLOGICAL SURGERY

## 2019-06-17 PROCEDURE — G8427 DOCREV CUR MEDS BY ELIG CLIN: HCPCS | Performed by: NEUROLOGICAL SURGERY

## 2019-06-17 PROCEDURE — 4004F PT TOBACCO SCREEN RCVD TLK: CPT | Performed by: NEUROLOGICAL SURGERY

## 2019-06-17 ASSESSMENT — ENCOUNTER SYMPTOMS
VOMITING: 0
ABDOMINAL DISTENTION: 0
COUGH: 0
ABDOMINAL PAIN: 0
BLOOD IN STOOL: 0
SORE THROAT: 0
SHORTNESS OF BREATH: 0
NAUSEA: 0
CHEST TIGHTNESS: 0
CONSTIPATION: 0
DIARRHEA: 0
PHOTOPHOBIA: 0
EYE PAIN: 0
RHINORRHEA: 0
WHEEZING: 0
BACK PAIN: 1

## 2019-06-17 NOTE — PROGRESS NOTES
[address] [place] [address]  Dept: [phone]    Patient:  Porsche Phelps  YOB: 1970  Date: 19    The patient is a 52 y.o. female who presents today for consult of the following problems:     Chief Complaint   Patient presents with    Neck Pain     EMG results, lower back pain, complete numbness lumbar and pain    Arm Pain    Numbness             HPI:     Porsche Phelps is a 52 y.o. female on whom neurosurgical consultation was requested by GERARDO Mike CNP for management of cervical spondylosis with radiculopathy. The patient states that since January she has had acute exacerbation of her axial neck pain that ranges anywhere from 6-10 out of 10 with limitation of range of motion to the right. In addition to that for greater than a year she has had significant achiness and pain in her right forearm upper arm as well as numbness and tingling in her middle finger and thumb predominantly. Symptoms are greater on the right side than left and worsened arm symptoms with neck motion including rotation and extension upwards. She has had multiple course of physical therapy with minimal to no relief and actually some exacerbation of the numbness. This is affecting her daily quality of life and she has difficulty with coordination of her hands including dropping things buttoning writing and feeding that has been progressing as far as her dominant hand goes. .      History:     Past Medical History:   Diagnosis Date    Chronic pain     POTS (postural orthostatic tachycardia syndrome)      Past Surgical History:   Procedure Laterality Date     SECTION      x2    HYSTERECTOMY      ovaries remain     Family History   Problem Relation Age of Onset    Cancer Mother         brain    Hypertension Father     Cancer Maternal Grandmother     Heart Disease Maternal Grandfather      Current Outpatient Medications on File Prior to Visit   Medication Sig Dispense Refill    baclofen (LIORESAL) 10 MG tablet Take 1 tablet by mouth 3 times daily 30 tablet 1    gabapentin (NEURONTIN) 100 MG capsule Take 1 capsule by mouth nightly for 30 days. Intended supply: 30 days 30 capsule 0    cyclobenzaprine (FLEXERIL) 10 MG tablet Take 10 mg by mouth 3 times daily as needed for Muscle spasms      predniSONE (DELTASONE) 20 MG tablet Take 3 tabs daily x 3 days, take 2 tabs daily x 3 days, then take 1 tab daily x 3 days 18 tablet 0    naproxen (NAPROSYN) 500 MG tablet Take 1 tablet by mouth 2 times daily (with meals) 60 tablet 3    metoprolol succinate (TOPROL XL) 25 MG extended release tablet Take 1 tablet by mouth daily 30 tablet 3     No current facility-administered medications on file prior to visit. Social History     Tobacco Use    Smoking status: Current Some Day Smoker     Packs/day: 1.00     Years: 30.00     Pack years: 30.00    Smokeless tobacco: Never Used   Substance Use Topics    Alcohol use: Yes     Alcohol/week: 7.2 oz     Types: 12 Standard drinks or equivalent per week     Comment: ocassional    Drug use: Yes     Types: Marijuana     Comment: ocassional       Allergies   Allergen Reactions    Diclofenac Swelling    Prozac [Fluoxetine Hcl]        Review of Systems  Constitutional: Negative for activity change and appetite change. HENT: Negative for ear pain and facial swelling. Eyes: Negative for discharge and itching. Respiratory: Negative for choking and chest tightness. Cardiovascular: Negative for chest pain and leg swelling. Gastrointestinal: Negative for nausea and abdominal pain. Endocrine: Negative for cold intolerance and heat intolerance. Genitourinary: Negative for frequency and flank pain. Musculoskeletal: Negative for myalgias and joint swelling. Skin: Negative for rash and wound. Allergic/Immunologic: Negative for environmental allergies and food allergies. Hematological: Negative for adenopathy. Does not bruise/bleed easily. Psychiatric/Behavioral: Negative for self-injury. The patient is not nervous/anxious. Physical Exam:      BP (!) 140/91 (Site: Right Upper Arm, Position: Sitting, Cuff Size: Small Adult)   Pulse 72   Wt 120 lb (54.4 kg)   BMI 19.97 kg/m²   Estimated body mass index is 19.97 kg/m² as calculated from the following:    Height as of 4/4/19: 5' 5\" (1.651 m). Weight as of this encounter: 120 lb (54.4 kg). General:  Natacha Soriano is a 52y.o. year old female who appears her stated age. HEENT: Normocephalic atraumatic. Neck supple. Chest: regular rate; pulses equal  Abdomen: Soft nontender nondistended. Normoactive bowel sounds. Ext: DP and PT pulses 2+, good cap refill  Neuro    Mentation  Appropriate affect  Registration intact  Orientation intact  3 item recall intact  Judgement intact to situation    Cranial Nerves:   Pupils equal and reactive to light  Extraocular motion intact  Face and shrug symmetric  Tongue midline  No dysarthria  v1-3 sensation symmetric, masseter tone symmetric  Hearing symmetric and intact to finger rub    Sensation:   Diminished sensation right C6 dermatome partial C7. Motor  L deltoid 5/5; R deltoid 5/5  L biceps 5/5; R biceps 5/5  L triceps 5/5; R triceps 5/5  L wrist extension 5/5; R wrist extension 5/5  L intrinsics 5/5; R intrinsics 5/5     L iliopsoas 5/5 , R iliopsoas 5/5  L quadriceps 5/5; R quadriceps 5/5  L Dorsiflexion 5/5; R dorsiflexion 5/5  L Plantarflexion 5/5; R plantarflexion 5/5  L EHL 5/5; R EHL 5/5    Reflexes  L Brachioradialis 2+/4; R brachioradialis 2+/4  L Biceps 2+/4; R Biceps 2+/4  L Triceps 2+/4; R Triceps 2+/4  L Patellar 2+/4: R Patellar 2+/4  L Achilles 2+/4; R Achilles 2+/4    hoffmans L: neg  hoffmans R: neg  Clonus L: neg  Clonus R: neg  Babinski L: up  Babinski R; up    Negative Tinel's at Carpal Tunl. and Cubital Tunl. Positive Phalen's right.   Positive Spurling's right  Studies Review:     MRI cervical spine does reveal broad-based disc protrusion at C5-6 with significant central stenosis and significant bilateral foraminal stenosis. C6-7 right greater than left foraminal stenosis    EMG shows C5-6-7 radiculopathy chronic    Assessment and Plan:      1. Cervical spondylosis with radiculopathy          Plan: I explained the patient that I believe she does have a cervical radiculopathy and less likely to have carpal tunnel syndrome on the right despite the Phalen's. She does not have Tinel's or nocturnal awakening and has no signs on EMG of peripheral neuropathy. I believe that considering the lack of red flag symptoms the patient is a candidate for surgical intervention to treat her foraminal stenosis considering she has had long-standing symptoms that are disabling and is failed conservative measures. I reiterated to her that I do not believe that any of her pathology is dangerous with imminent risk of spinal cord injury or paralysis. However it is a quality of life issue. I explained to the patient that there is no role for surgery in order to treat axial symptoms. However she does have significant radiculopathy mainly at C6 and C7 that would warrant an anterior cervical discectomy and fusion considering she has bilateral disease and would not be a candidate for a foraminotomy. I also believe that the significant axial pain that she has would be worsened with any lateral foraminotomy. Patient would like to move forward surgery in the form of intracervical discectomy and fusion at C5-6 and C6-7. She understands that the goals are to treat her radicular symptoms and decompress the neural elements. She also understands risks to include swallowing difficulty dysphagia recurrent laryngeal nerve injury and hoarseness, carotid injury with stroke bleeding infection hardware failure esophageal injury.   I explained to her that she will require cessation of tobacco use as well as using a collar for 6 weeks around-the-clock after surgery. Patient would like to move forward. Followup: No follow-ups on file. Prescriptions Ordered:  No orders of the defined types were placed in this encounter. Orders Placed:  No orders of the defined types were placed in this encounter. Electronically signed by Jyotsna Taveras DO on 6/17/2019 at 10:52 AM    Please note that this chart was generated using voice recognition Dragon dictation software. Although every effort was made to ensure the accuracy of this automated transcription, some errors in transcription may have occurred.

## 2019-06-19 ENCOUNTER — TELEPHONE (OUTPATIENT)
Dept: FAMILY MEDICINE CLINIC | Age: 49
End: 2019-06-19

## 2019-06-19 DIAGNOSIS — R31.9 HEMATURIA, UNSPECIFIED TYPE: ICD-10-CM

## 2019-06-19 DIAGNOSIS — R10.9 FLANK PAIN: Primary | ICD-10-CM

## 2019-06-19 NOTE — TELEPHONE ENCOUNTER
Patient called into the office because she is still having flank pain and would like to have the CT done. Please advise.  Patient using [employer] in [place]

## 2019-06-25 ENCOUNTER — PATIENT MESSAGE (OUTPATIENT)
Dept: FAMILY MEDICINE CLINIC | Age: 49
End: 2019-06-25

## 2019-06-28 ENCOUNTER — HOSPITAL ENCOUNTER (OUTPATIENT)
Dept: CT IMAGING | Facility: CLINIC | Age: 49
Discharge: HOME | End: 2019-06-30
Payer: MEDICARE

## 2019-06-28 DIAGNOSIS — R31.9 HEMATURIA, UNSPECIFIED TYPE: ICD-10-CM

## 2019-06-28 DIAGNOSIS — R10.9 FLANK PAIN: ICD-10-CM

## 2019-06-28 PROCEDURE — 74176 CT ABD & PELVIS W/O CONTRAST: CPT

## 2019-08-15 ENCOUNTER — HOSPITAL ENCOUNTER (OUTPATIENT)
Dept: GENERAL RADIOLOGY | Age: 49
Discharge: HOME | End: 2019-08-17
Payer: MEDICARE

## 2019-08-15 ENCOUNTER — HOSPITAL ENCOUNTER (OUTPATIENT)
Dept: PREADMISSION TESTING | Age: 49
Discharge: HOME | End: 2019-08-19
Payer: MEDICARE

## 2019-08-15 VITALS
RESPIRATION RATE: 16 BRPM | SYSTOLIC BLOOD PRESSURE: 145 MMHG | TEMPERATURE: 98.8 F | HEART RATE: 70 BPM | OXYGEN SATURATION: 99 % | BODY MASS INDEX: 20.33 KG/M2 | DIASTOLIC BLOOD PRESSURE: 86 MMHG | WEIGHT: 122 LBS | HEIGHT: 65 IN

## 2019-08-15 LAB
ABO/RH: NORMAL
ABSOLUTE EOS #: 0.09 K/UL (ref 0–0.44)
ABSOLUTE IMMATURE GRANULOCYTE: <0.03 K/UL (ref 0–0.3)
ABSOLUTE LYMPH #: 2.65 K/UL (ref 1.1–3.7)
ABSOLUTE MONO #: 0.58 K/UL (ref 0.1–1.2)
ANION GAP SERPL CALCULATED.3IONS-SCNC: 14 MMOL/L (ref 9–17)
ANTIBODY SCREEN: NEGATIVE
ARM BAND NUMBER: NORMAL
BASOPHILS # BLD: 1 % (ref 0–2)
BASOPHILS ABSOLUTE: 0.05 K/UL (ref 0–0.2)
BILIRUBIN URINE: NEGATIVE
BUN BLDV-MCNC: 8 MG/DL (ref 6–20)
BUN/CREAT BLD: NORMAL (ref 9–20)
CALCIUM SERPL-MCNC: 9.7 MG/DL (ref 8.6–10.4)
CHLORIDE BLD-SCNC: 104 MMOL/L (ref 98–107)
CO2: 24 MMOL/L (ref 20–31)
COLOR: YELLOW
COMMENT UA: ABNORMAL
CREAT SERPL-MCNC: 0.59 MG/DL (ref 0.5–0.9)
DIFFERENTIAL TYPE: NORMAL
EOSINOPHILS RELATIVE PERCENT: 1 % (ref 1–4)
EXPIRATION DATE: NORMAL
GFR AFRICAN AMERICAN: >60 ML/MIN
GFR NON-AFRICAN AMERICAN: >60 ML/MIN
GFR SERPL CREATININE-BSD FRML MDRD: NORMAL ML/MIN/{1.73_M2}
GFR SERPL CREATININE-BSD FRML MDRD: NORMAL ML/MIN/{1.73_M2}
GLUCOSE BLD-MCNC: 93 MG/DL (ref 70–99)
GLUCOSE URINE: NEGATIVE
HCT VFR BLD CALC: 45.2 % (ref 36.3–47.1)
HEMOGLOBIN: 15.1 G/DL (ref 11.9–15.1)
IMMATURE GRANULOCYTES: 0 %
INR BLD: 0.9
KETONES, URINE: NEGATIVE
LEUKOCYTE ESTERASE, URINE: NEGATIVE
LYMPHOCYTES # BLD: 37 % (ref 24–43)
MCH RBC QN AUTO: 32.1 PG (ref 25.2–33.5)
MCHC RBC AUTO-ENTMCNC: 33.4 G/DL (ref 28.4–34.8)
MCV RBC AUTO: 96.2 FL (ref 82.6–102.9)
MONOCYTES # BLD: 8 % (ref 3–12)
NITRITE, URINE: NEGATIVE
NRBC AUTOMATED: 0 PER 100 WBC
PARTIAL THROMBOPLASTIN TIME: 26.5 SEC (ref 20.5–30.5)
PDW BLD-RTO: 12.5 % (ref 11.8–14.4)
PH UA: 8.5 (ref 5–8)
PLATELET # BLD: 311 K/UL (ref 138–453)
PLATELET ESTIMATE: NORMAL
PMV BLD AUTO: 9.9 FL (ref 8.1–13.5)
POTASSIUM SERPL-SCNC: 4.6 MMOL/L (ref 3.7–5.3)
PROTEIN UA: NEGATIVE
PROTHROMBIN TIME: 10.1 SEC (ref 9–12)
RBC # BLD: 4.7 M/UL (ref 3.95–5.11)
RBC # BLD: NORMAL 10*6/UL
SEG NEUTROPHILS: 53 % (ref 36–65)
SEGMENTED NEUTROPHILS ABSOLUTE COUNT: 3.87 K/UL (ref 1.5–8.1)
SODIUM BLD-SCNC: 142 MMOL/L (ref 135–144)
SPECIFIC GRAVITY UA: 1.01 (ref 1–1.03)
TURBIDITY: CLEAR
URINE HGB: NEGATIVE
UROBILINOGEN, URINE: NORMAL
WBC # BLD: 7.3 K/UL (ref 3.5–11.3)
WBC # BLD: NORMAL 10*3/UL

## 2019-08-15 PROCEDURE — 85730 THROMBOPLASTIN TIME PARTIAL: CPT

## 2019-08-15 PROCEDURE — 86901 BLOOD TYPING SEROLOGIC RH(D): CPT

## 2019-08-15 PROCEDURE — 81003 URINALYSIS AUTO W/O SCOPE: CPT

## 2019-08-15 PROCEDURE — 80048 BASIC METABOLIC PNL TOTAL CA: CPT

## 2019-08-15 PROCEDURE — 86900 BLOOD TYPING SEROLOGIC ABO: CPT

## 2019-08-15 PROCEDURE — 86850 RBC ANTIBODY SCREEN: CPT

## 2019-08-15 PROCEDURE — 85610 PROTHROMBIN TIME: CPT

## 2019-08-15 PROCEDURE — 36415 COLL VENOUS BLD VENIPUNCTURE: CPT

## 2019-08-15 PROCEDURE — 71046 X-RAY EXAM CHEST 2 VIEWS: CPT

## 2019-08-15 PROCEDURE — 85025 COMPLETE CBC W/AUTO DIFF WBC: CPT

## 2019-08-15 PROCEDURE — 93005 ELECTROCARDIOGRAM TRACING: CPT | Performed by: NEUROLOGICAL SURGERY

## 2019-08-15 RX ORDER — SODIUM CHLORIDE, SODIUM LACTATE, POTASSIUM CHLORIDE, CALCIUM CHLORIDE 600; 310; 30; 20 MG/100ML; MG/100ML; MG/100ML; MG/100ML
1000 INJECTION, SOLUTION INTRAVENOUS CONTINUOUS
Status: CANCELLED | OUTPATIENT
Start: 2019-08-15

## 2019-08-15 RX ORDER — ACETAMINOPHEN 500 MG
500 TABLET ORAL EVERY 6 HOURS PRN
Status: ON HOLD | COMMUNITY
End: 2019-08-30 | Stop reason: HOSPADM

## 2019-08-15 RX ORDER — NICOTINE 21 MG/24HR
1 PATCH, TRANSDERMAL 24 HOURS TRANSDERMAL EVERY 24 HOURS
Status: ON HOLD | COMMUNITY
End: 2019-08-30 | Stop reason: HOSPADM

## 2019-08-15 ASSESSMENT — PAIN DESCRIPTION - ORIENTATION: ORIENTATION: POSTERIOR

## 2019-08-15 ASSESSMENT — PAIN SCALES - GENERAL: PAINLEVEL_OUTOF10: 3

## 2019-08-15 ASSESSMENT — PAIN DESCRIPTION - FREQUENCY: FREQUENCY: CONTINUOUS

## 2019-08-15 ASSESSMENT — PAIN DESCRIPTION - DESCRIPTORS: DESCRIPTORS: CONSTANT;PRESSURE;ACHING

## 2019-08-15 ASSESSMENT — PAIN DESCRIPTION - ONSET: ONSET: ON-GOING

## 2019-08-15 ASSESSMENT — PAIN DESCRIPTION - PAIN TYPE: TYPE: CHRONIC PAIN

## 2019-08-15 ASSESSMENT — PAIN DESCRIPTION - LOCATION: LOCATION: NECK

## 2019-08-15 ASSESSMENT — PAIN DESCRIPTION - PROGRESSION: CLINICAL_PROGRESSION: GRADUALLY WORSENING

## 2019-08-15 NOTE — PROGRESS NOTES
Anesthesia Focused Assessment    STOP-BANG Sleep Apnea Questionnaire    SNORE loudly (heard through closed doors)? No  TIRED, fatigued, sleepy during daytime? No  OBSERVED stopping breathing during sleep? No  High blood PRESSURE being treated? No    BMI over 35? No  AGE over 48? No  NECK circumference over 16\"? No  GENDER (male)? No             Total 0  High risk 5-8  Intermediate risk 3-4  Low risk 0-2    Obstructive Sleep Apnea: no  If YES, machine used: no     Type 1 DM:   no  T2DM:  no    Coronary Artery Disease:  no  Hypertension:  no    Active smoker:  Quit today, 1 ppd for 30 years  Drinks Alcohol:  18 beers per week    Dentition: WDL    Defib / AICD / Pacemaker: no      Renal Failure/dialysis:  no    Patient was evaluated in PAT & anesthesia guidelines were applied. NPO guidelines, medication instructions and scheduled arrival time were reviewed with patient.     Hx of anesthesia complications:  no  Family hx of anesthesia complications:  no                                                                                                                     Anesthesia contacted:   no  Medical or cardiac clearance ordered: william CRUM, LYNNC  8/15/19  1:29 PM

## 2019-08-15 NOTE — H&P (VIEW-ONLY)
History and Physical    Pt Name: Evelina Albrecht  MRN: [id number]  YOB: 1970  Date of evaluation: 8/15/2019    SUBJECTIVE:     History of Chief Complaint:    Patient complains of neck and right shoulder pain for at least several years with an increase in her pain in January of this year. She has tried tried physical therapy and pain medications. Both provided short term improvement but did not afford lasting relief of her pain. She has more recently had decreased strength in her hand and has been dropping items. She is scheduled for anterior cervical discectomy with fusion. Past Medical History    has a past medical history of Chronic pain, POTS (postural orthostatic tachycardia syndrome), and Wears glasses. Past Surgical History   has a past surgical history that includes  section (, ); Hysterectomy (); and Tonsillectomy. Medications    Current Outpatient Medications:     acetaminophen (TYLENOL) 500 MG tablet, Take 500 mg by mouth every 6 hours as needed for Pain, Disp: , Rfl:     nicotine (NICODERM CQ) 21 MG/24HR, Place 1 patch onto the skin every 24 hours, Disp: , Rfl:     baclofen (LIORESAL) 10 MG tablet, Take 1 tablet by mouth 3 times daily (Patient taking differently: Take 10 mg by mouth 3 times daily as needed ), Disp: 30 tablet, Rfl: 1    gabapentin (NEURONTIN) 100 MG capsule, Take 1 capsule by mouth nightly for 30 days. Intended supply: 30 days (Patient taking differently: Take 100 mg by mouth nightly as needed. Intended supply: 30 days), Disp: 30 capsule, Rfl: 0    cyclobenzaprine (FLEXERIL) 10 MG tablet, Take 5 mg by mouth 2 times daily as needed for Muscle spasms , Disp: , Rfl:   Allergies  is allergic to diclofenac and prozac [fluoxetine hcl].   Family History  family history includes Brain Cancer in her mother; Cancer in her maternal grandmother; Heart Attack in her maternal grandfather; Heart Disease in her maternal grandfather and mother; High Blood Pressure in

## 2019-08-16 LAB
EKG ATRIAL RATE: 61 BPM
EKG P AXIS: 72 DEGREES
EKG P-R INTERVAL: 168 MS
EKG Q-T INTERVAL: 400 MS
EKG QRS DURATION: 92 MS
EKG QTC CALCULATION (BAZETT): 402 MS
EKG R AXIS: 75 DEGREES
EKG T AXIS: 59 DEGREES
EKG VENTRICULAR RATE: 61 BPM

## 2019-08-16 PROCEDURE — 93010 ELECTROCARDIOGRAM REPORT: CPT | Performed by: INTERNAL MEDICINE

## 2019-08-29 ENCOUNTER — ANESTHESIA EVENT (OUTPATIENT)
Dept: OPERATING ROOM | Age: 49
DRG: 321 | End: 2019-08-29
Payer: MEDICARE

## 2019-08-29 ENCOUNTER — ANESTHESIA (OUTPATIENT)
Dept: OPERATING ROOM | Age: 49
DRG: 321 | End: 2019-08-29
Payer: MEDICARE

## 2019-08-29 ENCOUNTER — APPOINTMENT (OUTPATIENT)
Dept: GENERAL RADIOLOGY | Age: 49
DRG: 321 | End: 2019-08-29
Attending: NEUROLOGICAL SURGERY
Payer: MEDICARE

## 2019-08-29 ENCOUNTER — HOSPITAL ENCOUNTER (INPATIENT)
Age: 49
LOS: 1 days | Discharge: HOME | DRG: 321 | End: 2019-08-30
Attending: NEUROLOGICAL SURGERY | Admitting: NEUROLOGICAL SURGERY
Payer: MEDICARE

## 2019-08-29 VITALS — SYSTOLIC BLOOD PRESSURE: 128 MMHG | OXYGEN SATURATION: 100 % | TEMPERATURE: 98.8 F | DIASTOLIC BLOOD PRESSURE: 84 MMHG

## 2019-08-29 PROBLEM — Z98.1 S/P CERVICAL SPINAL FUSION: Status: ACTIVE | Noted: 2019-08-29

## 2019-08-29 PROCEDURE — C9359 IMPLNT,BON VOID FILLER-PUTTY: HCPCS | Performed by: NEUROLOGICAL SURGERY

## 2019-08-29 PROCEDURE — 22551 ARTHRD ANT NTRBDY CERVICAL: CPT | Performed by: NEUROLOGICAL SURGERY

## 2019-08-29 PROCEDURE — 0RG20A0 FUSION OF 2 OR MORE CERVICAL VERTEBRAL JOINTS WITH INTERBODY FUSION DEVICE, ANTERIOR APPROACH, ANTERIOR COLUMN, OPEN APPROACH: ICD-10-PCS | Performed by: NEUROLOGICAL SURGERY

## 2019-08-29 PROCEDURE — 2580000003 HC RX 258: Performed by: ANESTHESIOLOGY

## 2019-08-29 PROCEDURE — 2500000003 HC RX 250 WO HCPCS: Performed by: NEUROLOGICAL SURGERY

## 2019-08-29 PROCEDURE — C1713 ANCHOR/SCREW BN/BN,TIS/BN: HCPCS | Performed by: NEUROLOGICAL SURGERY

## 2019-08-29 PROCEDURE — 2580000003 HC RX 258: Performed by: NEUROLOGICAL SURGERY

## 2019-08-29 PROCEDURE — 22552 ARTHRD ANT NTRBD CERVICAL EA: CPT | Performed by: NEUROLOGICAL SURGERY

## 2019-08-29 PROCEDURE — 3600000004 HC SURGERY LEVEL 4 BASE: Performed by: NEUROLOGICAL SURGERY

## 2019-08-29 PROCEDURE — 1200000000 HC SEMI PRIVATE

## 2019-08-29 PROCEDURE — 2780000010 HC IMPLANT OTHER: Performed by: NEUROLOGICAL SURGERY

## 2019-08-29 PROCEDURE — 3600000014 HC SURGERY LEVEL 4 ADDTL 15MIN: Performed by: NEUROLOGICAL SURGERY

## 2019-08-29 PROCEDURE — 3700000001 HC ADD 15 MINUTES (ANESTHESIA): Performed by: NEUROLOGICAL SURGERY

## 2019-08-29 PROCEDURE — 2500000003 HC RX 250 WO HCPCS: Performed by: NURSE ANESTHETIST, CERTIFIED REGISTERED

## 2019-08-29 PROCEDURE — 22853 INSJ BIOMECHANICAL DEVICE: CPT | Performed by: NEUROLOGICAL SURGERY

## 2019-08-29 PROCEDURE — G0378 HOSPITAL OBSERVATION PER HR: HCPCS

## 2019-08-29 PROCEDURE — 7100000001 HC PACU RECOVERY - ADDTL 15 MIN: Performed by: NEUROLOGICAL SURGERY

## 2019-08-29 PROCEDURE — 6370000000 HC RX 637 (ALT 250 FOR IP): Performed by: EMERGENCY MEDICINE

## 2019-08-29 PROCEDURE — 6360000002 HC RX W HCPCS: Performed by: NURSE ANESTHETIST, CERTIFIED REGISTERED

## 2019-08-29 PROCEDURE — 22845 INSERT SPINE FIXATION DEVICE: CPT | Performed by: NEUROLOGICAL SURGERY

## 2019-08-29 PROCEDURE — 0RT30ZZ RESECTION OF CERVICAL VERTEBRAL DISC, OPEN APPROACH: ICD-10-PCS | Performed by: NEUROLOGICAL SURGERY

## 2019-08-29 PROCEDURE — 72040 X-RAY EXAM NECK SPINE 2-3 VW: CPT

## 2019-08-29 PROCEDURE — 2720000010 HC SURG SUPPLY STERILE: Performed by: NEUROLOGICAL SURGERY

## 2019-08-29 PROCEDURE — 6360000002 HC RX W HCPCS: Performed by: ANESTHESIOLOGY

## 2019-08-29 PROCEDURE — 6370000000 HC RX 637 (ALT 250 FOR IP): Performed by: NEUROLOGICAL SURGERY

## 2019-08-29 PROCEDURE — 6360000002 HC RX W HCPCS: Performed by: EMERGENCY MEDICINE

## 2019-08-29 PROCEDURE — 7100000000 HC PACU RECOVERY - FIRST 15 MIN: Performed by: NEUROLOGICAL SURGERY

## 2019-08-29 PROCEDURE — 2580000003 HC RX 258: Performed by: EMERGENCY MEDICINE

## 2019-08-29 PROCEDURE — 2709999900 HC NON-CHARGEABLE SUPPLY: Performed by: NEUROLOGICAL SURGERY

## 2019-08-29 PROCEDURE — 3700000000 HC ANESTHESIA ATTENDED CARE: Performed by: NEUROLOGICAL SURGERY

## 2019-08-29 DEVICE — SCREW SPNL L12MM DIA4MM ANT CERV SELF DRL VAR ANG SKYLINE: Type: IMPLANTABLE DEVICE | Site: SPINE CERVICAL | Status: FUNCTIONAL

## 2019-08-29 DEVICE — IMPLANTABLE DEVICE: Type: IMPLANTABLE DEVICE | Site: SPINE CERVICAL | Status: FUNCTIONAL

## 2019-08-29 DEVICE — SCREW SPNL L14MM DIA4MM ANT CERV TI SELF DRL VAR ANG FULL: Type: IMPLANTABLE DEVICE | Site: SPINE CERVICAL | Status: FUNCTIONAL

## 2019-08-29 DEVICE — CAGE SPNL H6MM 7DEG STD CERV THORLUM C FBR INTBDY FUS REINF: Type: IMPLANTABLE DEVICE | Site: SPINE CERVICAL | Status: FUNCTIONAL

## 2019-08-29 DEVICE — DBM 006001 PROGENIX PLUS 1CC
Type: IMPLANTABLE DEVICE | Site: SPINE CERVICAL | Status: FUNCTIONAL
Brand: PROGENIX® PUTTY AND PROGENIX® PLUS

## 2019-08-29 RX ORDER — MORPHINE SULFATE 2 MG/ML
4 INJECTION, SOLUTION INTRAMUSCULAR; INTRAVENOUS EVERY 4 HOURS PRN
Status: DISCONTINUED | OUTPATIENT
Start: 2019-08-29 | End: 2019-08-30 | Stop reason: HOSPADM

## 2019-08-29 RX ORDER — SODIUM CHLORIDE 0.9 % (FLUSH) 0.9 %
10 SYRINGE (ML) INJECTION PRN
Status: DISCONTINUED | OUTPATIENT
Start: 2019-08-29 | End: 2019-08-30 | Stop reason: HOSPADM

## 2019-08-29 RX ORDER — OXYCODONE HYDROCHLORIDE AND ACETAMINOPHEN 5; 325 MG/1; MG/1
2 TABLET ORAL EVERY 4 HOURS PRN
Status: DISCONTINUED | OUTPATIENT
Start: 2019-08-29 | End: 2019-08-30 | Stop reason: HOSPADM

## 2019-08-29 RX ORDER — FENTANYL CITRATE 50 UG/ML
25 INJECTION, SOLUTION INTRAMUSCULAR; INTRAVENOUS EVERY 5 MIN PRN
Status: COMPLETED | OUTPATIENT
Start: 2019-08-29 | End: 2019-08-29

## 2019-08-29 RX ORDER — PROPOFOL 10 MG/ML
INJECTION, EMULSION INTRAVENOUS PRN
Status: DISCONTINUED | OUTPATIENT
Start: 2019-08-29 | End: 2019-08-29 | Stop reason: SDUPTHER

## 2019-08-29 RX ORDER — GLYCOPYRROLATE 1 MG/5 ML
SYRINGE (ML) INTRAVENOUS PRN
Status: DISCONTINUED | OUTPATIENT
Start: 2019-08-29 | End: 2019-08-29 | Stop reason: SDUPTHER

## 2019-08-29 RX ORDER — LIDOCAINE HYDROCHLORIDE AND EPINEPHRINE 10; 10 MG/ML; UG/ML
INJECTION, SOLUTION INFILTRATION; PERINEURAL PRN
Status: DISCONTINUED | OUTPATIENT
Start: 2019-08-29 | End: 2019-08-29 | Stop reason: ALTCHOICE

## 2019-08-29 RX ORDER — ONDANSETRON 2 MG/ML
INJECTION INTRAMUSCULAR; INTRAVENOUS PRN
Status: DISCONTINUED | OUTPATIENT
Start: 2019-08-29 | End: 2019-08-29 | Stop reason: SDUPTHER

## 2019-08-29 RX ORDER — PROPOFOL 10 MG/ML
INJECTION, EMULSION INTRAVENOUS CONTINUOUS PRN
Status: DISCONTINUED | OUTPATIENT
Start: 2019-08-29 | End: 2019-08-29 | Stop reason: SDUPTHER

## 2019-08-29 RX ORDER — NEOSTIGMINE METHYLSULFATE 5 MG/5 ML
SYRINGE (ML) INTRAVENOUS PRN
Status: DISCONTINUED | OUTPATIENT
Start: 2019-08-29 | End: 2019-08-29 | Stop reason: SDUPTHER

## 2019-08-29 RX ORDER — MEPERIDINE HYDROCHLORIDE 50 MG/ML
12.5 INJECTION INTRAMUSCULAR; INTRAVENOUS; SUBCUTANEOUS EVERY 5 MIN PRN
Status: DISCONTINUED | OUTPATIENT
Start: 2019-08-29 | End: 2019-08-29 | Stop reason: HOSPADM

## 2019-08-29 RX ORDER — FENTANYL CITRATE 50 UG/ML
50 INJECTION, SOLUTION INTRAMUSCULAR; INTRAVENOUS EVERY 5 MIN PRN
Status: COMPLETED | OUTPATIENT
Start: 2019-08-29 | End: 2019-08-29

## 2019-08-29 RX ORDER — SODIUM CHLORIDE, SODIUM LACTATE, POTASSIUM CHLORIDE, CALCIUM CHLORIDE 600; 310; 30; 20 MG/100ML; MG/100ML; MG/100ML; MG/100ML
INJECTION, SOLUTION INTRAVENOUS CONTINUOUS
Status: DISCONTINUED | OUTPATIENT
Start: 2019-08-29 | End: 2019-08-30 | Stop reason: HOSPADM

## 2019-08-29 RX ORDER — CYCLOBENZAPRINE HCL 10 MG
10 TABLET ORAL 3 TIMES DAILY PRN
Status: DISCONTINUED | OUTPATIENT
Start: 2019-08-29 | End: 2019-08-30 | Stop reason: HOSPADM

## 2019-08-29 RX ORDER — FENTANYL CITRATE 50 UG/ML
INJECTION, SOLUTION INTRAMUSCULAR; INTRAVENOUS PRN
Status: DISCONTINUED | OUTPATIENT
Start: 2019-08-29 | End: 2019-08-29 | Stop reason: SDUPTHER

## 2019-08-29 RX ORDER — ONDANSETRON 2 MG/ML
4 INJECTION INTRAMUSCULAR; INTRAVENOUS EVERY 6 HOURS PRN
Status: DISCONTINUED | OUTPATIENT
Start: 2019-08-29 | End: 2019-08-30 | Stop reason: HOSPADM

## 2019-08-29 RX ORDER — LIDOCAINE HYDROCHLORIDE 10 MG/ML
INJECTION, SOLUTION EPIDURAL; INFILTRATION; INTRACAUDAL; PERINEURAL PRN
Status: DISCONTINUED | OUTPATIENT
Start: 2019-08-29 | End: 2019-08-29 | Stop reason: SDUPTHER

## 2019-08-29 RX ORDER — MIDAZOLAM HYDROCHLORIDE 1 MG/ML
INJECTION INTRAMUSCULAR; INTRAVENOUS PRN
Status: DISCONTINUED | OUTPATIENT
Start: 2019-08-29 | End: 2019-08-29 | Stop reason: SDUPTHER

## 2019-08-29 RX ORDER — MORPHINE SULFATE 2 MG/ML
6 INJECTION, SOLUTION INTRAMUSCULAR; INTRAVENOUS EVERY 4 HOURS PRN
Status: DISCONTINUED | OUTPATIENT
Start: 2019-08-29 | End: 2019-08-30 | Stop reason: HOSPADM

## 2019-08-29 RX ORDER — SODIUM CHLORIDE, SODIUM LACTATE, POTASSIUM CHLORIDE, CALCIUM CHLORIDE 600; 310; 30; 20 MG/100ML; MG/100ML; MG/100ML; MG/100ML
1000 INJECTION, SOLUTION INTRAVENOUS CONTINUOUS
Status: DISCONTINUED | OUTPATIENT
Start: 2019-08-29 | End: 2019-08-30 | Stop reason: HOSPADM

## 2019-08-29 RX ORDER — ONDANSETRON 2 MG/ML
4 INJECTION INTRAMUSCULAR; INTRAVENOUS
Status: DISCONTINUED | OUTPATIENT
Start: 2019-08-29 | End: 2019-08-29 | Stop reason: HOSPADM

## 2019-08-29 RX ORDER — SODIUM CHLORIDE 0.9 % (FLUSH) 0.9 %
10 SYRINGE (ML) INJECTION EVERY 12 HOURS SCHEDULED
Status: DISCONTINUED | OUTPATIENT
Start: 2019-08-29 | End: 2019-08-30 | Stop reason: HOSPADM

## 2019-08-29 RX ORDER — MAGNESIUM HYDROXIDE 1200 MG/15ML
LIQUID ORAL CONTINUOUS PRN
Status: COMPLETED | OUTPATIENT
Start: 2019-08-29 | End: 2019-08-29

## 2019-08-29 RX ORDER — OXYCODONE HYDROCHLORIDE AND ACETAMINOPHEN 5; 325 MG/1; MG/1
1 TABLET ORAL EVERY 4 HOURS PRN
Status: DISCONTINUED | OUTPATIENT
Start: 2019-08-29 | End: 2019-08-30 | Stop reason: HOSPADM

## 2019-08-29 RX ORDER — ROCURONIUM BROMIDE 10 MG/ML
INJECTION, SOLUTION INTRAVENOUS PRN
Status: DISCONTINUED | OUTPATIENT
Start: 2019-08-29 | End: 2019-08-29 | Stop reason: SDUPTHER

## 2019-08-29 RX ORDER — NALOXONE HYDROCHLORIDE 0.4 MG/ML
INJECTION, SOLUTION INTRAMUSCULAR; INTRAVENOUS; SUBCUTANEOUS PRN
Status: DISCONTINUED | OUTPATIENT
Start: 2019-08-29 | End: 2019-08-29 | Stop reason: SDUPTHER

## 2019-08-29 RX ORDER — DEXAMETHASONE SODIUM PHOSPHATE 10 MG/ML
INJECTION INTRAMUSCULAR; INTRAVENOUS PRN
Status: DISCONTINUED | OUTPATIENT
Start: 2019-08-29 | End: 2019-08-29 | Stop reason: SDUPTHER

## 2019-08-29 RX ORDER — NICOTINE 21 MG/24HR
1 PATCH, TRANSDERMAL 24 HOURS TRANSDERMAL DAILY
Status: DISCONTINUED | OUTPATIENT
Start: 2019-08-29 | End: 2019-08-30 | Stop reason: HOSPADM

## 2019-08-29 RX ADMIN — DEXTROSE MONOHYDRATE 2 G: 50 INJECTION, SOLUTION INTRAVENOUS at 21:15

## 2019-08-29 RX ADMIN — FENTANYL CITRATE 50 MCG: 50 INJECTION INTRAMUSCULAR; INTRAVENOUS at 11:48

## 2019-08-29 RX ADMIN — PHENYLEPHRINE HYDROCHLORIDE 50 MCG: 10 INJECTION INTRAVENOUS at 08:29

## 2019-08-29 RX ADMIN — ROCURONIUM BROMIDE 50 MG: 10 INJECTION INTRAVENOUS at 07:34

## 2019-08-29 RX ADMIN — FENTANYL CITRATE 25 MCG: 50 INJECTION INTRAMUSCULAR; INTRAVENOUS at 13:45

## 2019-08-29 RX ADMIN — FENTANYL CITRATE 50 MCG: 50 INJECTION INTRAMUSCULAR; INTRAVENOUS at 10:32

## 2019-08-29 RX ADMIN — CYCLOBENZAPRINE HYDROCHLORIDE 10 MG: 10 TABLET, FILM COATED ORAL at 13:05

## 2019-08-29 RX ADMIN — PROPOFOL 100 MCG/KG/MIN: 10 INJECTION, EMULSION INTRAVENOUS at 07:48

## 2019-08-29 RX ADMIN — PHENYLEPHRINE HYDROCHLORIDE 100 MCG: 10 INJECTION INTRAVENOUS at 08:09

## 2019-08-29 RX ADMIN — NALOXONE HYDROCHLORIDE 0.04 MG: 0.4 INJECTION, SOLUTION INTRAMUSCULAR; INTRAVENOUS; SUBCUTANEOUS at 10:28

## 2019-08-29 RX ADMIN — LIDOCAINE HYDROCHLORIDE 50 MG: 10 INJECTION, SOLUTION EPIDURAL; INFILTRATION; INTRACAUDAL; PERINEURAL at 07:34

## 2019-08-29 RX ADMIN — FENTANYL CITRATE 25 MCG: 50 INJECTION INTRAMUSCULAR; INTRAVENOUS at 14:02

## 2019-08-29 RX ADMIN — OXYCODONE HYDROCHLORIDE AND ACETAMINOPHEN 2 TABLET: 5; 325 TABLET ORAL at 13:05

## 2019-08-29 RX ADMIN — OXYCODONE HYDROCHLORIDE AND ACETAMINOPHEN 2 TABLET: 5; 325 TABLET ORAL at 17:00

## 2019-08-29 RX ADMIN — Medication 2 G: at 08:12

## 2019-08-29 RX ADMIN — ONDANSETRON 4 MG: 2 INJECTION, SOLUTION INTRAMUSCULAR; INTRAVENOUS at 10:03

## 2019-08-29 RX ADMIN — PROPOFOL 50 MG: 10 INJECTION, EMULSION INTRAVENOUS at 07:37

## 2019-08-29 RX ADMIN — SODIUM CHLORIDE, POTASSIUM CHLORIDE, SODIUM LACTATE AND CALCIUM CHLORIDE: 600; 310; 30; 20 INJECTION, SOLUTION INTRAVENOUS at 07:45

## 2019-08-29 RX ADMIN — DEXAMETHASONE SODIUM PHOSPHATE 10 MG: 10 INJECTION INTRAMUSCULAR; INTRAVENOUS at 08:00

## 2019-08-29 RX ADMIN — FENTANYL CITRATE 25 MCG: 50 INJECTION INTRAMUSCULAR; INTRAVENOUS at 13:25

## 2019-08-29 RX ADMIN — PROPOFOL 150 MG: 10 INJECTION, EMULSION INTRAVENOUS at 07:34

## 2019-08-29 RX ADMIN — Medication 3 MG: at 10:05

## 2019-08-29 RX ADMIN — FENTANYL CITRATE 50 MCG: 50 INJECTION INTRAMUSCULAR; INTRAVENOUS at 11:40

## 2019-08-29 RX ADMIN — SODIUM CHLORIDE, POTASSIUM CHLORIDE, SODIUM LACTATE AND CALCIUM CHLORIDE 1000 ML: 600; 310; 30; 20 INJECTION, SOLUTION INTRAVENOUS at 06:29

## 2019-08-29 RX ADMIN — SODIUM CHLORIDE, PRESERVATIVE FREE 10 ML: 5 INJECTION INTRAVENOUS at 21:17

## 2019-08-29 RX ADMIN — SUFENTANIL CITRATE 0.5 MCG/KG/HR: 50 INJECTION EPIDURAL; INTRAVENOUS at 07:48

## 2019-08-29 RX ADMIN — FENTANYL CITRATE 50 MCG: 50 INJECTION INTRAMUSCULAR; INTRAVENOUS at 07:34

## 2019-08-29 RX ADMIN — MIDAZOLAM HYDROCHLORIDE 2 MG: 1 INJECTION, SOLUTION INTRAMUSCULAR; INTRAVENOUS at 07:29

## 2019-08-29 RX ADMIN — OXYCODONE HYDROCHLORIDE AND ACETAMINOPHEN 2 TABLET: 5; 325 TABLET ORAL at 21:14

## 2019-08-29 RX ADMIN — FENTANYL CITRATE 25 MCG: 50 INJECTION INTRAMUSCULAR; INTRAVENOUS at 13:40

## 2019-08-29 RX ADMIN — FENTANYL CITRATE 50 MCG: 50 INJECTION INTRAMUSCULAR; INTRAVENOUS at 10:47

## 2019-08-29 RX ADMIN — Medication 0.6 MG: at 10:05

## 2019-08-29 RX ADMIN — FENTANYL CITRATE 50 MCG: 50 INJECTION INTRAMUSCULAR; INTRAVENOUS at 10:35

## 2019-08-29 ASSESSMENT — PULMONARY FUNCTION TESTS
PIF_VALUE: 15
PIF_VALUE: 18
PIF_VALUE: 15
PIF_VALUE: 16
PIF_VALUE: 15
PIF_VALUE: 14
PIF_VALUE: 16
PIF_VALUE: 1
PIF_VALUE: 15
PIF_VALUE: 20
PIF_VALUE: 16
PIF_VALUE: 16
PIF_VALUE: 15
PIF_VALUE: 16
PIF_VALUE: 6
PIF_VALUE: 16
PIF_VALUE: 14
PIF_VALUE: 15
PIF_VALUE: 15
PIF_VALUE: 16
PIF_VALUE: 15
PIF_VALUE: 14
PIF_VALUE: 16
PIF_VALUE: 7
PIF_VALUE: 14
PIF_VALUE: 15
PIF_VALUE: 1
PIF_VALUE: 15
PIF_VALUE: 15
PIF_VALUE: 16
PIF_VALUE: 16
PIF_VALUE: 14
PIF_VALUE: 14
PIF_VALUE: 16
PIF_VALUE: 2
PIF_VALUE: 14
PIF_VALUE: 16
PIF_VALUE: 14
PIF_VALUE: 15
PIF_VALUE: 15
PIF_VALUE: 16
PIF_VALUE: 15
PIF_VALUE: 16
PIF_VALUE: 16
PIF_VALUE: 18
PIF_VALUE: 17
PIF_VALUE: 15
PIF_VALUE: 2
PIF_VALUE: 15
PIF_VALUE: 16
PIF_VALUE: 16
PIF_VALUE: 14
PIF_VALUE: 15
PIF_VALUE: 14
PIF_VALUE: 3
PIF_VALUE: 15
PIF_VALUE: 16
PIF_VALUE: 16
PIF_VALUE: 15
PIF_VALUE: 14
PIF_VALUE: 16
PIF_VALUE: 15
PIF_VALUE: 14
PIF_VALUE: 1
PIF_VALUE: 16
PIF_VALUE: 15
PIF_VALUE: 14
PIF_VALUE: 15
PIF_VALUE: 15
PIF_VALUE: 16
PIF_VALUE: 15
PIF_VALUE: 0
PIF_VALUE: 13
PIF_VALUE: 15
PIF_VALUE: 15
PIF_VALUE: 16
PIF_VALUE: 15
PIF_VALUE: 21
PIF_VALUE: 16
PIF_VALUE: 15
PIF_VALUE: 14
PIF_VALUE: 15
PIF_VALUE: 14
PIF_VALUE: 16
PIF_VALUE: 15
PIF_VALUE: 16
PIF_VALUE: 14
PIF_VALUE: 3
PIF_VALUE: 16
PIF_VALUE: 14
PIF_VALUE: 22
PIF_VALUE: 16
PIF_VALUE: 15
PIF_VALUE: 16
PIF_VALUE: 16
PIF_VALUE: 14
PIF_VALUE: 16
PIF_VALUE: 15
PIF_VALUE: 15
PIF_VALUE: 16
PIF_VALUE: 0
PIF_VALUE: 16
PIF_VALUE: 15
PIF_VALUE: 15
PIF_VALUE: 1
PIF_VALUE: 14
PIF_VALUE: 15
PIF_VALUE: 16
PIF_VALUE: 15
PIF_VALUE: 16
PIF_VALUE: 15
PIF_VALUE: 16
PIF_VALUE: 15
PIF_VALUE: 16
PIF_VALUE: 15
PIF_VALUE: 14
PIF_VALUE: 15
PIF_VALUE: 16
PIF_VALUE: 14
PIF_VALUE: 15
PIF_VALUE: 14
PIF_VALUE: 15
PIF_VALUE: 13
PIF_VALUE: 15
PIF_VALUE: 14
PIF_VALUE: 15
PIF_VALUE: 16
PIF_VALUE: 16
PIF_VALUE: 14
PIF_VALUE: 14
PIF_VALUE: 16

## 2019-08-29 ASSESSMENT — PAIN - FUNCTIONAL ASSESSMENT
PAIN_FUNCTIONAL_ASSESSMENT: 0-10
PAIN_FUNCTIONAL_ASSESSMENT: 0-10

## 2019-08-29 ASSESSMENT — PAIN SCALES - GENERAL
PAINLEVEL_OUTOF10: 9
PAINLEVEL_OUTOF10: 2
PAINLEVEL_OUTOF10: 5
PAINLEVEL_OUTOF10: 5
PAINLEVEL_OUTOF10: 8
PAINLEVEL_OUTOF10: 7
PAINLEVEL_OUTOF10: 5
PAINLEVEL_OUTOF10: 6
PAINLEVEL_OUTOF10: 7
PAINLEVEL_OUTOF10: 5
PAINLEVEL_OUTOF10: 8

## 2019-08-29 ASSESSMENT — PAIN DESCRIPTION - DESCRIPTORS: DESCRIPTORS: ACHING;CONSTANT

## 2019-08-29 ASSESSMENT — PAIN DESCRIPTION - ORIENTATION: ORIENTATION: RIGHT

## 2019-08-29 ASSESSMENT — PAIN DESCRIPTION - LOCATION: LOCATION: SHOULDER

## 2019-08-29 ASSESSMENT — PAIN DESCRIPTION - PAIN TYPE: TYPE: SURGICAL PAIN

## 2019-08-29 NOTE — PROGRESS NOTES
Smoking Cessation - topics covered   []  Health Risks  []  Benefits of Quitting   []  Smoking Cessation  []  Patient has no history of tobacco use  []  Patient is former smoker. []  No need for tobacco cessation education. []  Booklet given  []  Patient verbalizes understanding. []  Patient denies need for tobacco cessation education. [x]  Unable to meet with patient today. Will follow up as able.   Zayra Alvarez  3:24 PM

## 2019-08-29 NOTE — ANESTHESIA PRE PROCEDURE
kg/m².    CBC:   Lab Results   Component Value Date    WBC 7.3 08/15/2019    RBC 4.70 08/15/2019    HGB 15.1 08/15/2019    HCT 45.2 08/15/2019    MCV 96.2 08/15/2019    RDW 12.5 08/15/2019     08/15/2019       CMP:   Lab Results   Component Value Date     08/15/2019    K 4.6 08/15/2019     08/15/2019    CO2 24 08/15/2019    BUN 8 08/15/2019    CREATININE 0.59 08/15/2019    GFRAA >60 08/15/2019    LABGLOM >60 08/15/2019    GLUCOSE 93 08/15/2019    PROT 7.0 08/14/2018    CALCIUM 9.7 08/15/2019    BILITOT 0.53 08/14/2018    ALKPHOS 69 08/14/2018    AST 17 08/14/2018    ALT 11 08/14/2018       POC Tests: No results for input(s): POCGLU, POCNA, POCK, POCCL, POCBUN, POCHEMO, POCHCT in the last 72 hours. Coags:   Lab Results   Component Value Date    PROTIME 10.1 08/15/2019    INR 0.9 08/15/2019    APTT 26.5 08/15/2019       HCG (If Applicable): No results found for: PREGTESTUR, PREGSERUM, HCG, HCGQUANT     ABGs: No results found for: PHART, PO2ART, IEL9TMM, IRJ9YIK, BEART, R0CQIRKJ     Type & Screen (If Applicable):  No results found for: [facility]    Anesthesia Evaluation  Patient summary reviewed and Nursing notes reviewed no history of anesthetic complications:   Airway: Mallampati: III  TM distance: >3 FB   Neck ROM: limited  Mouth opening: > = 3 FB Dental: normal exam         Pulmonary:Negative Pulmonary ROS and normal exam                               Cardiovascular:Negative CV ROS                      Neuro/Psych:   (+) headaches:,             GI/Hepatic/Renal: Neg GI/Hepatic/Renal ROS            Endo/Other: Negative Endo/Other ROS                    Abdominal:           Vascular: negative vascular ROS. Anesthesia Plan      general     ASA 2     (Anesthesia consent signed by patient  No apparent weakness extremities x4)  Induction: intravenous. MIPS: Postoperative opioids intended.   Anesthetic plan and risks discussed with patient and

## 2019-08-30 ENCOUNTER — APPOINTMENT (OUTPATIENT)
Dept: GENERAL RADIOLOGY | Age: 49
DRG: 321 | End: 2019-08-30
Attending: NEUROLOGICAL SURGERY
Payer: MEDICARE

## 2019-08-30 VITALS
SYSTOLIC BLOOD PRESSURE: 148 MMHG | TEMPERATURE: 98.3 F | HEIGHT: 65 IN | OXYGEN SATURATION: 99 % | DIASTOLIC BLOOD PRESSURE: 86 MMHG | BODY MASS INDEX: 20.58 KG/M2 | RESPIRATION RATE: 14 BRPM | WEIGHT: 123.5 LBS | HEART RATE: 66 BPM

## 2019-08-30 PROCEDURE — 72040 X-RAY EXAM NECK SPINE 2-3 VW: CPT

## 2019-08-30 PROCEDURE — 99024 POSTOP FOLLOW-UP VISIT: CPT | Performed by: NEUROLOGICAL SURGERY

## 2019-08-30 PROCEDURE — 2580000003 HC RX 258: Performed by: EMERGENCY MEDICINE

## 2019-08-30 PROCEDURE — G0378 HOSPITAL OBSERVATION PER HR: HCPCS

## 2019-08-30 PROCEDURE — 6360000002 HC RX W HCPCS: Performed by: EMERGENCY MEDICINE

## 2019-08-30 PROCEDURE — 6370000000 HC RX 637 (ALT 250 FOR IP): Performed by: EMERGENCY MEDICINE

## 2019-08-30 PROCEDURE — 6370000000 HC RX 637 (ALT 250 FOR IP): Performed by: STUDENT IN AN ORGANIZED HEALTH CARE EDUCATION/TRAINING PROGRAM

## 2019-08-30 RX ORDER — LIDOCAINE 4 G/G
1 PATCH TOPICAL DAILY
Status: DISCONTINUED | OUTPATIENT
Start: 2019-08-30 | End: 2019-08-30 | Stop reason: HOSPADM

## 2019-08-30 RX ORDER — GABAPENTIN 100 MG/1
100 CAPSULE ORAL NIGHTLY
Qty: 30 CAPSULE | Refills: 0 | Status: SHIPPED | OUTPATIENT
Start: 2019-08-30 | End: 2020-06-29

## 2019-08-30 RX ORDER — NICOTINE 21 MG/24HR
1 PATCH, TRANSDERMAL 24 HOURS TRANSDERMAL DAILY
Qty: 30 PATCH | Refills: 3 | Status: SHIPPED | OUTPATIENT
Start: 2019-08-31 | End: 2020-06-29

## 2019-08-30 RX ORDER — CYCLOBENZAPRINE HCL 10 MG
10 TABLET ORAL 3 TIMES DAILY PRN
Qty: 60 TABLET | Refills: 0 | Status: SHIPPED | OUTPATIENT
Start: 2019-08-30 | End: 2020-09-29 | Stop reason: SDUPTHER

## 2019-08-30 RX ORDER — OXYCODONE HYDROCHLORIDE AND ACETAMINOPHEN 5; 325 MG/1; MG/1
1 TABLET ORAL EVERY 6 HOURS PRN
Qty: 28 TABLET | Refills: 0 | Status: SHIPPED | OUTPATIENT
Start: 2019-08-30 | End: 2019-09-06

## 2019-08-30 RX ADMIN — DEXTROSE MONOHYDRATE 2 G: 50 INJECTION, SOLUTION INTRAVENOUS at 03:54

## 2019-08-30 RX ADMIN — OXYCODONE HYDROCHLORIDE AND ACETAMINOPHEN 2 TABLET: 5; 325 TABLET ORAL at 04:04

## 2019-08-30 RX ADMIN — OXYCODONE HYDROCHLORIDE AND ACETAMINOPHEN 2 TABLET: 5; 325 TABLET ORAL at 13:15

## 2019-08-30 RX ADMIN — OXYCODONE HYDROCHLORIDE AND ACETAMINOPHEN 2 TABLET: 5; 325 TABLET ORAL at 08:24

## 2019-08-30 ASSESSMENT — PAIN DESCRIPTION - LOCATION
LOCATION: NECK
LOCATION: NECK

## 2019-08-30 ASSESSMENT — PAIN DESCRIPTION - PAIN TYPE
TYPE: SURGICAL PAIN
TYPE: SURGICAL PAIN

## 2019-08-30 ASSESSMENT — PAIN SCALES - GENERAL
PAINLEVEL_OUTOF10: 8
PAINLEVEL_OUTOF10: 4
PAINLEVEL_OUTOF10: 7
PAINLEVEL_OUTOF10: 8

## 2019-08-30 NOTE — PROGRESS NOTES
CLINICAL PHARMACY NOTE: MEDS TO [address] Select Patient?: Yes  Total # of Prescriptions Filled: 4   The following medications were delivered to the patient:  · OXYCODONE-ACETAMINOPHEN 5/325 MG  · GABAPENTIN 100 MG  · CYCLOBENZAPINE 10 MG  · NICOTINE 14 MG /HR PATCH  Total # of Interventions Completed: 1  Time Spent (min): 60    Additional Documentation:MEDICATIONS WERE DELIVERED TO THE ROOM.

## 2019-08-30 NOTE — CARE COORDINATION
Discharge [id number] [facility]  Clinical Case Management Department  Written by:  Alejandre RN    Patient Name: Ervin Booker  Attending Provider: Holli Castano DO  Admit Date: 2019  5:30 AM  MRN: [id number]  Account: [de-identified]                     : 1970  Discharge Date: 19      Disposition: home    Alejandre RN

## 2019-09-11 ENCOUNTER — OFFICE VISIT (OUTPATIENT)
Dept: NEUROSURGERY | Age: 49
End: 2019-09-11

## 2019-09-11 VITALS
HEART RATE: 76 BPM | SYSTOLIC BLOOD PRESSURE: 119 MMHG | DIASTOLIC BLOOD PRESSURE: 86 MMHG | WEIGHT: 122 LBS | BODY MASS INDEX: 20.3 KG/M2

## 2019-09-11 DIAGNOSIS — M47.22 CERVICAL SPONDYLOSIS WITH RADICULOPATHY: Primary | ICD-10-CM

## 2019-09-11 PROCEDURE — 99024 POSTOP FOLLOW-UP VISIT: CPT | Performed by: NEUROLOGICAL SURGERY

## 2019-09-11 RX ORDER — OXYCODONE HYDROCHLORIDE AND ACETAMINOPHEN 5; 325 MG/1; MG/1
1 TABLET ORAL EVERY 4 HOURS PRN
COMMUNITY
End: 2020-06-29

## 2019-09-11 NOTE — PROGRESS NOTES
Eating ok. No swallowing difficulty. RUE resolved pain but now discomfort L C5 distribution and traprezial    Full strength.  No sensory deficits  cdi wound  Collar in place    S/p C5-7 acdf  6wk followup with xrays  counselled on time frame of 3-6mo for radiculopathy    Katerin Fuentes 80 DO  Neurosurgery  O: 94 31 11  C: 83 [facility]

## 2019-09-16 ENCOUNTER — TELEPHONE (OUTPATIENT)
Dept: NEUROSURGERY | Age: 49
End: 2019-09-16

## 2019-10-03 ENCOUNTER — HOSPITAL ENCOUNTER (OUTPATIENT)
Dept: GENERAL RADIOLOGY | Facility: CLINIC | Age: 49
Discharge: HOME | End: 2019-10-05

## 2019-10-03 ENCOUNTER — HOSPITAL ENCOUNTER (OUTPATIENT)
Facility: CLINIC | Age: 49
Discharge: HOME | End: 2019-10-05

## 2019-10-03 DIAGNOSIS — M47.22 CERVICAL SPONDYLOSIS WITH RADICULOPATHY: ICD-10-CM

## 2019-10-03 PROCEDURE — 72040 X-RAY EXAM NECK SPINE 2-3 VW: CPT

## 2019-10-23 ENCOUNTER — OFFICE VISIT (OUTPATIENT)
Dept: NEUROSURGERY | Age: 49
End: 2019-10-23

## 2019-10-23 VITALS
DIASTOLIC BLOOD PRESSURE: 92 MMHG | BODY MASS INDEX: 21.13 KG/M2 | SYSTOLIC BLOOD PRESSURE: 133 MMHG | WEIGHT: 127 LBS | HEART RATE: 84 BPM

## 2019-10-23 DIAGNOSIS — M47.22 CERVICAL SPONDYLOSIS WITH RADICULOPATHY: Primary | ICD-10-CM

## 2019-10-23 PROCEDURE — 99024 POSTOP FOLLOW-UP VISIT: CPT | Performed by: NEUROLOGICAL SURGERY

## 2019-10-23 RX ORDER — CYCLOBENZAPRINE HCL 10 MG
5 TABLET ORAL
COMMUNITY
End: 2020-06-29

## 2020-06-29 ENCOUNTER — NURSE TRIAGE (OUTPATIENT)
Dept: OTHER | Facility: CLINIC | Age: 50
End: 2020-06-29

## 2020-06-29 ENCOUNTER — HOSPITAL ENCOUNTER (EMERGENCY)
Facility: CLINIC | Age: 50
Discharge: HOME | End: 2020-06-29
Attending: EMERGENCY MEDICINE

## 2020-06-29 VITALS
SYSTOLIC BLOOD PRESSURE: 149 MMHG | TEMPERATURE: 98.2 F | HEART RATE: 62 BPM | OXYGEN SATURATION: 99 % | WEIGHT: 130 LBS | RESPIRATION RATE: 18 BRPM | BODY MASS INDEX: 21.66 KG/M2 | HEIGHT: 65 IN | DIASTOLIC BLOOD PRESSURE: 85 MMHG

## 2020-06-29 LAB
ABSOLUTE EOS #: 0.1 K/UL (ref 0–0.4)
ABSOLUTE IMMATURE GRANULOCYTE: NORMAL K/UL (ref 0–0.3)
ABSOLUTE LYMPH #: 2.1 K/UL (ref 1–4.8)
ABSOLUTE MONO #: 0.4 K/UL (ref 0.1–1.2)
ANION GAP SERPL CALCULATED.3IONS-SCNC: 7 MMOL/L (ref 9–17)
BASOPHILS # BLD: 2 % (ref 0–2)
BASOPHILS ABSOLUTE: 0.1 K/UL (ref 0–0.2)
BUN BLDV-MCNC: 9 MG/DL (ref 6–20)
BUN/CREAT BLD: ABNORMAL (ref 9–20)
CALCIUM SERPL-MCNC: 9.3 MG/DL (ref 8.6–10.4)
CHLORIDE BLD-SCNC: 105 MMOL/L (ref 98–107)
CO2: 23 MMOL/L (ref 20–31)
CREAT SERPL-MCNC: 0.5 MG/DL (ref 0.5–0.9)
DIFFERENTIAL TYPE: NORMAL
EOSINOPHILS RELATIVE PERCENT: 1 % (ref 1–4)
GFR AFRICAN AMERICAN: >60 ML/MIN
GFR NON-AFRICAN AMERICAN: >60 ML/MIN
GFR SERPL CREATININE-BSD FRML MDRD: ABNORMAL ML/MIN/{1.73_M2}
GFR SERPL CREATININE-BSD FRML MDRD: ABNORMAL ML/MIN/{1.73_M2}
GLUCOSE BLD-MCNC: 96 MG/DL (ref 70–99)
HCT VFR BLD CALC: 45.3 % (ref 36–46)
HEMOGLOBIN: 15 G/DL (ref 12–16)
IMMATURE GRANULOCYTES: NORMAL %
LYMPHOCYTES # BLD: 35 % (ref 24–44)
MCH RBC QN AUTO: 31.8 PG (ref 26–34)
MCHC RBC AUTO-ENTMCNC: 33.1 G/DL (ref 31–37)
MCV RBC AUTO: 96.2 FL (ref 80–100)
MONOCYTES # BLD: 7 % (ref 2–11)
NRBC AUTOMATED: NORMAL PER 100 WBC
PDW BLD-RTO: 13.3 % (ref 12.5–15.4)
PLATELET # BLD: 277 K/UL (ref 140–450)
PLATELET ESTIMATE: NORMAL
PMV BLD AUTO: 8.1 FL (ref 6–12)
POTASSIUM SERPL-SCNC: 3.9 MMOL/L (ref 3.7–5.3)
RBC # BLD: 4.71 M/UL (ref 4–5.2)
RBC # BLD: NORMAL 10*6/UL
SEG NEUTROPHILS: 55 % (ref 36–66)
SEGMENTED NEUTROPHILS ABSOLUTE COUNT: 3.4 K/UL (ref 1.8–7.7)
SODIUM BLD-SCNC: 135 MMOL/L (ref 135–144)
WBC # BLD: 6.2 K/UL (ref 3.5–11)
WBC # BLD: NORMAL 10*3/UL

## 2020-06-29 PROCEDURE — 93005 ELECTROCARDIOGRAM TRACING: CPT | Performed by: EMERGENCY MEDICINE

## 2020-06-29 PROCEDURE — 36415 COLL VENOUS BLD VENIPUNCTURE: CPT

## 2020-06-29 PROCEDURE — 99282 EMERGENCY DEPT VISIT SF MDM: CPT

## 2020-06-29 PROCEDURE — 85025 COMPLETE CBC W/AUTO DIFF WBC: CPT

## 2020-06-29 PROCEDURE — 80048 BASIC METABOLIC PNL TOTAL CA: CPT

## 2020-06-29 PROCEDURE — 6370000000 HC RX 637 (ALT 250 FOR IP): Performed by: EMERGENCY MEDICINE

## 2020-06-29 RX ORDER — ACETAMINOPHEN 500 MG
1000 TABLET ORAL ONCE
Status: COMPLETED | OUTPATIENT
Start: 2020-06-29 | End: 2020-06-29

## 2020-06-29 RX ADMIN — ACETAMINOPHEN 1000 MG: 500 TABLET ORAL at 14:27

## 2020-06-29 ASSESSMENT — ENCOUNTER SYMPTOMS
EYE PAIN: 0
ABDOMINAL PAIN: 0
SHORTNESS OF BREATH: 0
NAUSEA: 0
DIARRHEA: 0
VOMITING: 0
COUGH: 0
BACK PAIN: 0

## 2020-06-29 ASSESSMENT — PAIN SCALES - GENERAL: PAINLEVEL_OUTOF10: 5

## 2020-06-29 ASSESSMENT — PAIN DESCRIPTION - LOCATION: LOCATION: HEAD

## 2020-06-29 ASSESSMENT — PAIN DESCRIPTION - DESCRIPTORS: DESCRIPTORS: PRESSURE

## 2020-06-29 ASSESSMENT — PAIN DESCRIPTION - PAIN TYPE: TYPE: ACUTE PAIN

## 2020-06-29 NOTE — ED PROVIDER NOTES
[facility]  [address] [facility]  Phone: [phone]        Pt Name: Edmond Stewart  MRN: [id number]  Stefangfzac 1970  Date of evaluation: 6/29/20      CHIEF COMPLAINT       Chief Complaint   Patient presents with    Hypertension     reports that she has been having high blood pressure with a history of hypertension, states that her family doctor sent for evaluation          Melissa Fletcher is a 48 y.o. female who presents with chief complaint of elevated blood pressure patient has a history of pots syndrome she is had problems with high and low blood pressure she says this last year her pressures been a little on the high side she was try to contact her primary care provider and at home with her blood pressure was was 158 and then 171 will repeat they suggested she come to urgent care or emergency department she has no chest pain no shortness of breath no leg swelling she has a slight headache. Patient states the last meds she was on was beta-blocker for her blood pressure and that made her too fatigued    REVIEW OF SYSTEMS         Review of Systems   Constitutional: Negative for chills and fever. HENT: Negative for congestion and ear pain. Eyes: Negative for pain and visual disturbance. Respiratory: Negative for cough and shortness of breath. Cardiovascular: Negative for chest pain, palpitations and leg swelling. Gastrointestinal: Negative for abdominal pain, diarrhea, nausea and vomiting. Endocrine: Negative for polydipsia and polyuria. Genitourinary: Negative for difficulty urinating, dysuria and frequency. Musculoskeletal: Negative for back pain, joint swelling, myalgias, neck pain and neck stiffness. Skin: Negative for rash. Neurological: Positive for headaches. Negative for dizziness and weakness. Hematological: Negative for adenopathy. Does not bruise/bleed easily.    Psychiatric/Behavioral: Negative for confusion, self-injury and Immature Granulocytes NOT REPORTED 0 %    Segs Absolute 3.40 1.8 - 7.7 k/uL    Absolute Lymph # 2.10 1.0 - 4.8 k/uL    Absolute Mono # 0.40 0.1 - 1.2 k/uL    Absolute Eos # 0.10 0.0 - 0.4 k/uL    Basophils Absolute 0.10 0.0 - 0.2 k/uL    Absolute Immature Granulocyte NOT REPORTED 0.00 - 0.30 k/uL    WBC Morphology NOT REPORTED     RBC Morphology NOT REPORTED     Platelet Estimate NOT REPORTED    Basic Metabolic Panel   Result Value Ref Range    Glucose 96 70 - 99 mg/dL    BUN 9 6 - 20 mg/dL    CREATININE 0.50 0.50 - 0.90 mg/dL    Bun/Cre Ratio NOT REPORTED 9 - 20    Calcium 9.3 8.6 - 10.4 mg/dL    Sodium 135 135 - 144 mmol/L    Potassium 3.9 3.7 - 5.3 mmol/L    Chloride 105 98 - 107 mmol/L    CO2 23 20 - 31 mmol/L    Anion Gap 7 (L) 9 - 17 mmol/L    GFR Non-African American >60 >60 mL/min    GFR African American >60 >60 mL/min    GFR Comment          GFR Staging NOT REPORTED    EKG 12 Lead   Result Value Ref Range    Ventricular Rate 62 BPM    Atrial Rate 62 BPM    P-R Interval 170 ms    QRS Duration 96 ms    Q-T Interval 436 ms    QTc Calculation (Bazett) 442 ms    P Axis 73 degrees    R Axis 75 degrees    T Axis 59 degrees           EMERGENCY DEPARTMENT COURSE:   Vitals:    Vitals:    06/29/20 1328 06/29/20 1425   BP: (!) 167/99 (!) 149/85   Pulse: 76 62   Resp: 18 18   Temp: 98.2 °F (36.8 °C)    TempSrc: Oral    SpO2: 98% 99%   Weight: 59 kg (130 lb)    Height: 5' 5\" (1.651 m)      -------------------------  BP: (!) 149/85, Temp: 98.2 °F (36.8 °C), Pulse: 62, Resp: 18          CONSULTS:      PROCEDURES:  None    FINAL IMPRESSION      1.  Elevated blood pressure reading          DISPOSITION/PLAN   Discharged in stable condition    PATIENT REFERRED TO:  GERARDO Douglass - [address],  [address]  [facility], [place] [address] [facility] [address]  [phone]    In 3 days        DISCHARGE MEDICATIONS:  New Prescriptions    No medications on file       (Please note that portions of this note were completed with a voice recognition

## 2020-06-30 LAB
EKG ATRIAL RATE: 62 BPM
EKG P AXIS: 73 DEGREES
EKG P-R INTERVAL: 170 MS
EKG Q-T INTERVAL: 436 MS
EKG QRS DURATION: 96 MS
EKG QTC CALCULATION (BAZETT): 442 MS
EKG R AXIS: 75 DEGREES
EKG T AXIS: 59 DEGREES
EKG VENTRICULAR RATE: 62 BPM

## 2020-07-06 ENCOUNTER — HOSPITAL ENCOUNTER (OUTPATIENT)
Age: 50
Setting detail: SPECIMEN
Discharge: HOME | End: 2020-07-06

## 2020-07-06 ENCOUNTER — OFFICE VISIT (OUTPATIENT)
Dept: FAMILY MEDICINE CLINIC | Age: 50
End: 2020-07-06

## 2020-07-06 VITALS
HEIGHT: 65 IN | SYSTOLIC BLOOD PRESSURE: 160 MMHG | TEMPERATURE: 95.1 F | OXYGEN SATURATION: 98 % | HEART RATE: 75 BPM | RESPIRATION RATE: 16 BRPM | WEIGHT: 130 LBS | BODY MASS INDEX: 21.66 KG/M2 | DIASTOLIC BLOOD PRESSURE: 96 MMHG

## 2020-07-06 LAB — TSH SERPL DL<=0.05 MIU/L-ACNC: 0.88 MIU/L (ref 0.3–5)

## 2020-07-06 PROCEDURE — G0444 DEPRESSION SCREEN ANNUAL: HCPCS | Performed by: NURSE PRACTITIONER

## 2020-07-06 PROCEDURE — 99214 OFFICE O/P EST MOD 30 MIN: CPT | Performed by: NURSE PRACTITIONER

## 2020-07-06 PROCEDURE — G8431 POS CLIN DEPRES SCRN F/U DOC: HCPCS | Performed by: NURSE PRACTITIONER

## 2020-07-06 RX ORDER — DULOXETIN HYDROCHLORIDE 30 MG/1
60 CAPSULE, DELAYED RELEASE ORAL DAILY
Qty: 60 CAPSULE | Refills: 0 | Status: SHIPPED | OUTPATIENT
Start: 2020-07-06 | End: 2020-07-08 | Stop reason: SDUPTHER

## 2020-07-06 SDOH — SOCIAL STABILITY: SOCIAL INSECURITY: WITHIN THE LAST YEAR, HAVE YOU BEEN HUMILIATED OR EMOTIONALLY ABUSED IN OTHER WAYS BY YOUR PARTNER OR EX-PARTNER?: NO

## 2020-07-06 SDOH — ECONOMIC STABILITY: FOOD INSECURITY: WITHIN THE PAST 12 MONTHS, THE FOOD YOU BOUGHT JUST DIDN'T LAST AND YOU DIDN'T HAVE MONEY TO GET MORE.: NEVER TRUE

## 2020-07-06 SDOH — HEALTH STABILITY: PHYSICAL HEALTH: ON AVERAGE, HOW MANY MINUTES DO YOU ENGAGE IN EXERCISE AT THIS LEVEL?: 0 MIN

## 2020-07-06 SDOH — ECONOMIC STABILITY: TRANSPORTATION INSECURITY
IN THE PAST 12 MONTHS, HAS THE LACK OF TRANSPORTATION KEPT YOU FROM MEDICAL APPOINTMENTS OR FROM GETTING MEDICATIONS?: NO

## 2020-07-06 SDOH — SOCIAL STABILITY: SOCIAL INSECURITY
WITHIN THE LAST YEAR, HAVE YOU BEEN KICKED, HIT, SLAPPED, OR OTHERWISE PHYSICALLY HURT BY YOUR PARTNER OR EX-PARTNER?: NO

## 2020-07-06 SDOH — SOCIAL STABILITY: SOCIAL NETWORK: HOW OFTEN DO YOU ATTEND CHURCH OR RELIGIOUS SERVICES?: NEVER

## 2020-07-06 SDOH — SOCIAL STABILITY: SOCIAL INSECURITY
WITHIN THE LAST YEAR, HAVE TO BEEN RAPED OR FORCED TO HAVE ANY KIND OF SEXUAL ACTIVITY BY YOUR PARTNER OR EX-PARTNER?: NO

## 2020-07-06 SDOH — SOCIAL STABILITY: SOCIAL NETWORK: ARE YOU MARRIED, WIDOWED, DIVORCED, SEPARATED, NEVER MARRIED, OR LIVING WITH A PARTNER?: MARRIED

## 2020-07-06 SDOH — ECONOMIC STABILITY: INCOME INSECURITY: HOW HARD IS IT FOR YOU TO PAY FOR THE VERY BASICS LIKE FOOD, HOUSING, MEDICAL CARE, AND HEATING?: NOT HARD AT ALL

## 2020-07-06 SDOH — HEALTH STABILITY: MENTAL HEALTH
STRESS IS WHEN SOMEONE FEELS TENSE, NERVOUS, ANXIOUS, OR CAN'T SLEEP AT NIGHT BECAUSE THEIR MIND IS TROUBLED. HOW STRESSED ARE YOU?: TO SOME EXTENT

## 2020-07-06 SDOH — SOCIAL STABILITY: SOCIAL NETWORK: HOW OFTEN DO YOU ATTENT MEETINGS OF THE CLUB OR ORGANIZATION YOU BELONG TO?: NEVER

## 2020-07-06 SDOH — HEALTH STABILITY: PHYSICAL HEALTH: ON AVERAGE, HOW MANY DAYS PER WEEK DO YOU ENGAGE IN MODERATE TO STRENUOUS EXERCISE (LIKE A BRISK WALK)?: 0 DAYS

## 2020-07-06 SDOH — HEALTH STABILITY: MENTAL HEALTH: HOW OFTEN DO YOU HAVE A DRINK CONTAINING ALCOHOL?: MONTHLY OR LESS

## 2020-07-06 SDOH — SOCIAL STABILITY: SOCIAL NETWORK: IN A TYPICAL WEEK, HOW MANY TIMES DO YOU TALK ON THE PHONE WITH FAMILY, FRIENDS, OR NEIGHBORS?: ONCE A WEEK

## 2020-07-06 SDOH — SOCIAL STABILITY: SOCIAL NETWORK: HOW OFTEN DO YOU GET TOGETHER WITH FRIENDS OR RELATIVES?: ONCE A WEEK

## 2020-07-06 SDOH — SOCIAL STABILITY: SOCIAL INSECURITY: WITHIN THE LAST YEAR, HAVE YOU BEEN AFRAID OF YOUR PARTNER OR EX-PARTNER?: NO

## 2020-07-06 SDOH — SOCIAL STABILITY: SOCIAL NETWORK
DO YOU BELONG TO ANY CLUBS OR ORGANIZATIONS SUCH AS CHURCH GROUPS UNIONS, FRATERNAL OR ATHLETIC GROUPS, OR SCHOOL GROUPS?: NO

## 2020-07-06 SDOH — ECONOMIC STABILITY: FOOD INSECURITY: WITHIN THE PAST 12 MONTHS, YOU WORRIED THAT YOUR FOOD WOULD RUN OUT BEFORE YOU GOT MONEY TO BUY MORE.: NEVER TRUE

## 2020-07-06 SDOH — ECONOMIC STABILITY: TRANSPORTATION INSECURITY
IN THE PAST 12 MONTHS, HAS LACK OF TRANSPORTATION KEPT YOU FROM MEETINGS, WORK, OR FROM GETTING THINGS NEEDED FOR DAILY LIVING?: NO

## 2020-07-06 SDOH — HEALTH STABILITY: MENTAL HEALTH: HOW MANY STANDARD DRINKS CONTAINING ALCOHOL DO YOU HAVE ON A TYPICAL DAY?: 1 OR 2

## 2020-07-06 ASSESSMENT — PATIENT HEALTH QUESTIONNAIRE - PHQ9
3. TROUBLE FALLING OR STAYING ASLEEP: 3
9. THOUGHTS THAT YOU WOULD BE BETTER OFF DEAD, OR OF HURTING YOURSELF: 0
6. FEELING BAD ABOUT YOURSELF - OR THAT YOU ARE A FAILURE OR HAVE LET YOURSELF OR YOUR FAMILY DOWN: 3
7. TROUBLE CONCENTRATING ON THINGS, SUCH AS READING THE NEWSPAPER OR WATCHING TELEVISION: 3
SUM OF ALL RESPONSES TO PHQ QUESTIONS 1-9: 23
SUM OF ALL RESPONSES TO PHQ9 QUESTIONS 1 & 2: 5
8. MOVING OR SPEAKING SO SLOWLY THAT OTHER PEOPLE COULD HAVE NOTICED. OR THE OPPOSITE, BEING SO FIGETY OR RESTLESS THAT YOU HAVE BEEN MOVING AROUND A LOT MORE THAN USUAL: 3
10. IF YOU CHECKED OFF ANY PROBLEMS, HOW DIFFICULT HAVE THESE PROBLEMS MADE IT FOR YOU TO DO YOUR WORK, TAKE CARE OF THINGS AT HOME, OR GET ALONG WITH OTHER PEOPLE: 2
5. POOR APPETITE OR OVEREATING: 3
1. LITTLE INTEREST OR PLEASURE IN DOING THINGS: 2
4. FEELING TIRED OR HAVING LITTLE ENERGY: 3
SUM OF ALL RESPONSES TO PHQ QUESTIONS 1-9: 23
2. FEELING DOWN, DEPRESSED OR HOPELESS: 3

## 2020-07-06 ASSESSMENT — COLUMBIA-SUICIDE SEVERITY RATING SCALE - C-SSRS
6. HAVE YOU EVER DONE ANYTHING, STARTED TO DO ANYTHING, OR PREPARED TO DO ANYTHING TO END YOUR LIFE?: NO
1. WITHIN THE PAST MONTH, HAVE YOU WISHED YOU WERE DEAD OR WISHED YOU COULD GO TO SLEEP AND NOT WAKE UP?: NO
2. HAVE YOU ACTUALLY HAD ANY THOUGHTS OF KILLING YOURSELF?: NO

## 2020-07-06 NOTE — PATIENT INSTRUCTIONS
It was my pleasure to meet with you today. Please contact me with any questions or concerns, and please notify myself or our manger if there is anyway we can improve our service in your health care needs.  Below I have listed some instructions and information that pertain to today's visit.    -You have been advised to continue all current medication, otherwise not discussed in today's visit  -New medications and refills will been sent and made available at pharmacy or mail away  -Heathy daily diet to include healthy balanced diet with good portions of lean meats and vegetables  -Drink 6-8 glasses of water daily  -Complete  non-fasting labs and/any other testing ordered prior to next scheduled followup  -Complete urgent labs and special testing ordered today to be evaluated once result are in and you will be notified or will be discussed at your next appointment

## 2020-07-06 NOTE — PROGRESS NOTES
On the basis of positive PHQ-9 screening (PHQ-9 Total Score: 23), the following plan was implemented: false positive screen suspected- will re-evaluate at next visit. Patient will follow-up in 5 week(s) with PCP. [address]   [address]  [phone]    7/6/2020     Patient ID: Rody Nunez is a 48 y.o. female. CHIEF COMPLAINT:     Rody Nunez presents today for her medical conditions/complaints as noted below. 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 REVIEWED:      [x] Past Medical, Family, and Social History was reviewed per writer and does contribute to the patient presenting condition.     [x] Laboratory Results, Vital signs, Imaging, Active Problems, Immunizations, Current/Recently Discontinued Medications, Health Maintenance Activities Due, Referral Notes (if available) were reviewed per writer     [x] Reviewed Depression screening if taken or valid today or any other valid screening tool (others seen below) Interpretation of Total Score DepressionSeverity: 1-4 = Minimal depression, 5-9 = Mild depression, 10-14 = Moderate depression, 15-19 = Moderately severe depression, 20-27 =Severe depression    PHQ Scores 7/6/2020 2/5/2019 8/14/2018   PHQ2 Score 5 0 0   PHQ9 Score 23 0 0       Interpretation of Total Score DepressionSeverity: 1-4 = Minimal depression, 5-9 = Mild depression, 10-14 = Moderate depression, 15-19 = Moderately severe depression, 20-27 = Severe depression    Allergies   Allergen Reactions    Diclofenac Shortness Of Breath, Swelling, Rash and Other (See Comments)     Throat swelling, tremors    Fluoxetine Anaphylaxis, Shortness Of Breath and Swelling     Throat swelling    Prozac [Fluoxetine Hcl] Shortness Of Breath and Swelling     Throat swelling     Current Outpatient Medications   Medication Sig Dispense Refill    DULoxetine (CYMBALTA) 30 MG extended release capsule Take 2 capsules by mouth daily 60 capsule 0     No current facility-administered medications for this visit. Past Medical History:   Diagnosis Date    Chronic pain     POTS (postural orthostatic tachycardia syndrome)     Wears glasses       Past Surgical History:   Procedure Laterality Date    CERVICAL FUSION  08/29/2019     ANTERIOR CERVICAL DISCECTOMY, FUSION C5-7    CERVICAL FUSION N/A 8/29/2019    ANTERIOR CERVICAL DISCECTOMY, FUSION C5-7 performed by Remberto Murray DO at [address], 2000    x2    HYSTERECTOMY  2006    ovaries remain    TONSILLECTOMY       Family History   Problem Relation Age of Onset    Brain Cancer Mother     Stroke Mother     Heart Disease Mother         stents    Hypertension Father     High Blood Pressure Brother     Cancer Maternal Grandmother         UNKNOWN SITES    Heart Disease Maternal Grandfather     Heart Attack Maternal Grandfather      Social History     Tobacco Use    Smoking status: Current Some Day Smoker     Packs/day: 0.50     Years: 30.00     Pack years: 15.00     Types: Cigarettes     Start date: 1985    Smokeless tobacco: Never Used    Tobacco comment: Cut Down half of pack patient has tried multiple times to quit    Substance Use Topics    Alcohol use: Yes     Frequency: Monthly or less     Drinks per session: 1 or 2     Binge frequency: Never     Comment: 1 or 2 beers occasionally not very often         Patient Care Team:    REVIEW OF SYSTEMS:     Review of Systems   Constitutional: Positive for activity change and fatigue. Negative for unexpected weight change. HENT: Negative for congestion, ear pain, hearing loss, rhinorrhea and sore throat. Respiratory: Negative for cough and shortness of breath.     Cardiovascular: Negative for chest pain, in regards to all of her symptoms patient and I discussed thoroughly the usage of a possible duloxetine trial to see if this will help with both neurologic pain depression anxiety and overall myalgia pain as well. Patient states she is willing to try however she does have significant difficulty when starting new medications. However with this plan I would prefer to complete cardiac work-up, allow her to see neurosurgery for further evaluation, start medication for pain and anxiety and depression and review her labs. PHYSICAL EXAM:     BP (!) 160/96 (Site: Right Upper Arm, Position: Sitting, Cuff Size: Medium Adult)   Pulse 75   Temp 95.1 °F (35.1 °C) (Temporal)   Resp 16   Ht 5' 5\" (1.651 m)   Wt 130 lb (59 kg)   LMP  (LMP Unknown) Comment: Dos not know LMS before hysterectomy   SpO2 98%   BMI 21.63 kg/m²    Physical Exam  Vitals signs reviewed. Constitutional:       Appearance: She is ill-appearing. HENT:      Head: Normocephalic and atraumatic. Eyes:      Extraocular Movements: Extraocular movements intact. Pupils: Pupils are equal, round, and reactive to light. Neck:      Musculoskeletal: Normal range of motion and neck supple. Vascular: No carotid bruit. Cardiovascular:      Rate and Rhythm: Normal rate and regular rhythm. Pulses: Normal pulses. Heart sounds: Normal heart sounds. Pulmonary:      Effort: Pulmonary effort is normal.      Breath sounds: Normal breath sounds. Abdominal:      General: Abdomen is flat. There is no distension. Palpations: Abdomen is soft. There is no mass. Tenderness: There is no abdominal tenderness. There is no right CVA tenderness or left CVA tenderness. Musculoskeletal: Normal range of motion. General: No swelling or tenderness. Arms:    Skin:     General: Skin is warm. Capillary Refill: Capillary refill takes less than 2 seconds. Findings: No erythema or rash.    Neurological: General: No focal deficit present. Mental Status: She is alert and oriented to person, place, and time. Psychiatric:         Mood and Affect: Mood normal.         Behavior: Behavior normal.          ASSESSMENT /PLAN     1. Positive depression screening  Start medication  Complete testing  - Positive Screen for Clinical Depression with a Documented Follow-up Plan   - TSH; Future  - Thyroid Antibodies; Future    2. Polyarthralgia      3. S/P cervical spinal fusion  See neurosurgery    4. Cervical paraspinal muscle spasm  Start medication  5. POTS (postural orthostatic tachycardia syndrome)  Complete testing  - Echocardiogram complete; Future    6. Dizziness  Complete testing  - Echocardiogram complete; Future    7. Elevated blood pressure reading  Complete testing  - Echocardiogram complete; Future      Return in about 6 weeks (around 8/17/2020) for symptom check and lab review. COMMUNICATION:           The best way to find yourself is to lose yourself in the service of others - [id number] [facility]   [email or web address]  Office: [phone]   Cell: [phone]    Electronically signed by SENA Ordonez on 7/23/2020 at 8:13 PM

## 2020-07-07 LAB
THYROGLOBULIN AB: <20 IU/ML (ref 0–40)
THYROID PEROXIDASE (TPO) AB: <10 IU/ML (ref 0–35)

## 2020-07-08 RX ORDER — DULOXETIN HYDROCHLORIDE 30 MG/1
60 CAPSULE, DELAYED RELEASE ORAL DAILY
Qty: 60 CAPSULE | Refills: 0 | Status: SHIPPED | OUTPATIENT
Start: 2020-07-08 | End: 2020-08-04 | Stop reason: SDUPTHER

## 2020-07-08 NOTE — TELEPHONE ENCOUNTER
Patient contacted the office request the Cymbalta be sent to [facility] in [place] due to cheaper cost with good RX coupon. RX pended with correct pharmacy.

## 2020-07-09 ENCOUNTER — OFFICE VISIT (OUTPATIENT)
Dept: NEUROSURGERY | Age: 50
End: 2020-07-09

## 2020-07-09 VITALS
HEART RATE: 80 BPM | TEMPERATURE: 97.4 F | DIASTOLIC BLOOD PRESSURE: 96 MMHG | RESPIRATION RATE: 16 BRPM | SYSTOLIC BLOOD PRESSURE: 144 MMHG

## 2020-07-09 PROCEDURE — 99214 OFFICE O/P EST MOD 30 MIN: CPT | Performed by: NURSE PRACTITIONER

## 2020-07-09 NOTE — PROGRESS NOTES
[facility]  [address]  [address] [place] [id number]  Dept: [phone]    Patient:  Remberto Del Cid  YOB: 1970  Date: 7/9/20    The patient is a 48 y.o. female who presents today for consult of the following problems:     Chief Complaint   Patient presents with    Follow-up     discomfort in her incision area    Numbness    Tingling    Pain         HPI:     Remberto Del Cid is a 48 y.o. female on whom neurosurgical consultation was requested by GERARDO Rosales CNP for management of neck pain and arm symptoms. Patient does have a history of cervical ACDF last August.  Radicular symptoms did seem to improve following this. However has developed progressive issues with gait instability, frequent falls. Reports that she has had several episodes of passing out and falling. Has had episodes of urinary incontinence, though she states she has had some issues with urinary incontinence over the course of her life. Has been having issues with intermittent numbness and tingling to bilateral hands and feet, no distinct dermatomal pattern. Does state that she is having issues with dexterity,  strength. Reports she drops objects frequently. Reports notable decline in handwriting. Nocturnal Awakening: Yes  Reason: pain    MYELOPATHY    Frequently dropping things: Yes  Difficulty with buttoning clothes, using zipper, putting on watch OR jewelry: Yes  Changes in handwriting: Yes  Numbness or tingling: Yes    LIMITATIONS    Pain significantly limiting on a daily basis   Daily pharmacologic pain control include: cymbalta  Neck : Arm pain: Neck 50 // Arms 50    MANAGEMENT     Prior Surgery: Yes  Prior to 1yr ago:   In the last year:    Physical Therapy: No   Chiropractic Interventions: No   Injections: No  Improvement: n/a    Injections/response: n/a    History:     Past Medical History:   Diagnosis Date    Chronic pain     POTS (postural orthostatic tachycardia syndrome)     Wears glasses      Past Surgical History:   Procedure Laterality Date    CERVICAL FUSION  08/29/2019     ANTERIOR CERVICAL DISCECTOMY, FUSION C5-7    CERVICAL FUSION N/A 8/29/2019    ANTERIOR CERVICAL DISCECTOMY, FUSION C5-7 performed by Johnny Segura DO at [address], 2000    x2    HYSTERECTOMY  2006    ovaries remain    TONSILLECTOMY       Family History   Problem Relation Age of Onset    Brain Cancer Mother     Stroke Mother     Heart Disease Mother         stents    Hypertension Father     High Blood Pressure Brother     Cancer Maternal Grandmother         UNKNOWN SITES    Heart Disease Maternal Grandfather     Heart Attack Maternal Grandfather      Current Outpatient Medications on File Prior to Visit   Medication Sig Dispense Refill    DULoxetine (CYMBALTA) 30 MG extended release capsule Take 2 capsules by mouth daily 60 capsule 0     No current facility-administered medications on file prior to visit.       Social History     Tobacco Use    Smoking status: Current Some Day Smoker     Packs/day: 0.50     Years: 30.00     Pack years: 15.00     Types: Cigarettes     Start date: 1985    Smokeless tobacco: Never Used    Tobacco comment: Cut Down half of pack patient has tried multiple times to quit    Substance Use Topics    Alcohol use: Yes     Frequency: Monthly or less     Drinks per session: 1 or 2     Binge frequency: Never     Comment: 1 or 2 beers occasionally not very often     Drug use: Yes     Types: Marijuana     Comment: ocassional helps appetite and releases stress       Allergies   Allergen Reactions    Diclofenac Shortness Of Breath, Swelling, Rash and Other (See Comments)     Throat swelling, tremors    Fluoxetine Anaphylaxis, Shortness Of Breath and Swelling     Throat swelling    Prozac [Fluoxetine Hcl] Shortness Of Breath and Swelling     Throat swelling       Review of Systems  Constitutional: Negative for activity change and appetite change. HENT: Negative for ear pain and facial swelling. Eyes: Negative for discharge and itching. Respiratory: Negative for choking and chest tightness. Cardiovascular: Negative for chest pain and leg swelling. Gastrointestinal: Negative for nausea and abdominal pain. Endocrine: Negative for cold intolerance and heat intolerance. Genitourinary: Negative for frequency and flank pain. Musculoskeletal: Negative for myalgias and joint swelling. Skin: Negative for rash and wound. Allergic/Immunologic: Negative for environmental allergies and food allergies. Hematological: Negative for adenopathy. Does not bruise/bleed easily. Psychiatric/Behavioral: Negative for self-injury. The patient is not nervous/anxious. Physical Exam:      BP (!) 144/96 (Site: Right Upper Arm, Position: Sitting, Cuff Size: Medium Adult)   Pulse 80   Temp 97.4 °F (36.3 °C)   Resp 16   LMP  (LMP Unknown) Comment: Dos not know LMS before hysterectomy   Estimated body mass index is 21.63 kg/m² as calculated from the following:    Height as of 7/6/20: 5' 5\" (1.651 m). Weight as of 7/6/20: 130 lb (59 kg). General:  Michelle Rao is a 48y.o. year old female who appears her stated age. HEENT: Normocephalic atraumatic. Neck supple. Chest: regular rate; pulses equal  Abdomen: Soft nontender nondistended.    Ext: DP and PT pulses 2+, good cap refill  Neuro    Mentation  Appropriate affect  Registration intact  Orientation intact  3 item recall intact  Judgement intact to situation    Cranial Nerves:   Pupils equal and reactive to light  Extraocular motion intact  Face and shrug symmetric  Tongue midline  No dysarthria  v1-3 sensation symmetric, masseter tone symmetric  Hearing symmetric and intact    Sensation: decreased to bilateral 5th fingers  Ring finger split: negative    Motor  L deltoid 5/5; R deltoid 5/5  L biceps 5/5; R biceps 5/5  L triceps 5/5; R triceps 5/5  L wrist extension 5/5; R wrist extension 5/5  L intrinsics 5/5; R intrinsics 5/5     L iliopsoas 5/5 , R iliopsoas 5/5  L quadriceps 5/5; R quadriceps 5/5  L Dorsiflexion 5/5; R dorsiflexion 5/5  L Plantarflexion 5/5; R plantarflexion 5/5  L EHL 5/5; R EHL 5/5    Reflexes  L Brachioradialis 2+/4; R brachioradialis 2+/4  L Biceps 3+/4; R Biceps 3+/4  L Triceps 2+/4; R Triceps 2+/4  L Patellar 3+/4: R Patellar 3+/4  L Achilles 3+/4; R Achilles 3+/4    hoffmans L: neg  hoffmans R: neg  Clonus L: +1 beat  Clonus R: +1 beat  Babinski L: neg  Babinski R: neg    Spurlings: No  Lhermittes: No    Tinels at elbow: Yes-right side  Tinels at wrist: No  Phalens: No  Ok sign: No  Froments: No  Benedictine Hand: No    Atrophy of thenar: No  Atrophy of hypothenar: No    Studies Review:     No new imaging    Assessment and Plan:      1. Cervical myelopathy ([facility] [address]          Plan: Patient presents with concerns regarding progressive symptoms. Patient has been having issues with gait instability, falls, hand dexterity,  strength bilaterally. States that this has been progressive since shortly postoperative ACDF last August.  Reports that she has been having issues with urinary incontinence at times. Reports she has had multiple episodes of passing out and falling. Does also have a history of POTS. Has been following with her PCP for this, has not been able to afford seeing her specialist.  Patient does have complaints concerning for cervical myelopathy, as well as physical findings of hyperreflexia and clonus. Discussed the importance of obtaining cervical MRI for evaluation of any central stenosis. Followup: Return in about 3 weeks (around 7/30/2020), or if symptoms worsen or fail to improve. Prescriptions Ordered:  No orders of the defined types were placed in this encounter.        Orders Placed:  Orders Placed This Encounter   Procedures    MRI Cervical Spine WO Contrast     Standing Status:   Future     Standing

## 2020-07-14 ENCOUNTER — HOSPITAL ENCOUNTER (OUTPATIENT)
Dept: NON INVASIVE DIAGNOSTICS | Age: 50
Discharge: HOME | End: 2020-07-14

## 2020-07-14 LAB
LV EF: 55 %
LVEF MODALITY: NORMAL

## 2020-07-14 PROCEDURE — 93306 TTE W/DOPPLER COMPLETE: CPT

## 2020-07-20 ENCOUNTER — HOSPITAL ENCOUNTER (OUTPATIENT)
Dept: MRI IMAGING | Age: 50
Discharge: HOME | End: 2020-07-22

## 2020-07-20 PROCEDURE — 72141 MRI NECK SPINE W/O DYE: CPT

## 2020-07-23 ASSESSMENT — ENCOUNTER SYMPTOMS
RHINORRHEA: 0
SORE THROAT: 0
DIARRHEA: 1
SHORTNESS OF BREATH: 0
CONSTIPATION: 0
COUGH: 0

## 2020-07-30 ENCOUNTER — OFFICE VISIT (OUTPATIENT)
Dept: NEUROSURGERY | Age: 50
End: 2020-07-30

## 2020-07-30 VITALS
SYSTOLIC BLOOD PRESSURE: 129 MMHG | HEIGHT: 65 IN | BODY MASS INDEX: 20.83 KG/M2 | WEIGHT: 125 LBS | OXYGEN SATURATION: 97 % | TEMPERATURE: 97.9 F | DIASTOLIC BLOOD PRESSURE: 90 MMHG | HEART RATE: 80 BPM

## 2020-07-30 PROCEDURE — 99213 OFFICE O/P EST LOW 20 MIN: CPT | Performed by: NURSE PRACTITIONER

## 2020-07-30 NOTE — PROGRESS NOTES
Injections: No  Improvement: n/a     Injections/response: n/a      History:     Past Medical History:   Diagnosis Date    Chronic pain     POTS (postural orthostatic tachycardia syndrome)     Wears glasses      Past Surgical History:   Procedure Laterality Date    CERVICAL FUSION  08/29/2019     ANTERIOR CERVICAL DISCECTOMY, FUSION C5-7    CERVICAL FUSION N/A 8/29/2019    ANTERIOR CERVICAL DISCECTOMY, FUSION C5-7 performed by Markie Underwood DO at [address], 2000    x2    HYSTERECTOMY  2006    ovaries remain    TONSILLECTOMY       Family History   Problem Relation Age of Onset    Brain Cancer Mother     Stroke Mother     Heart Disease Mother         stents    Hypertension Father     High Blood Pressure Brother     Cancer Maternal Grandmother         UNKNOWN SITES    Heart Disease Maternal Grandfather     Heart Attack Maternal Grandfather      Current Outpatient Medications on File Prior to Visit   Medication Sig Dispense Refill    DULoxetine (CYMBALTA) 30 MG extended release capsule Take 2 capsules by mouth daily 60 capsule 0     No current facility-administered medications on file prior to visit.       Social History     Tobacco Use    Smoking status: Current Some Day Smoker     Packs/day: 0.50     Years: 30.00     Pack years: 15.00     Types: Cigarettes     Start date: 1985    Smokeless tobacco: Never Used    Tobacco comment: Cut Down half of pack patient has tried multiple times to quit    Substance Use Topics    Alcohol use: Yes     Frequency: Monthly or less     Drinks per session: 1 or 2     Binge frequency: Never     Comment: 1 or 2 beers occasionally not very often     Drug use: Yes     Types: Marijuana     Comment: ocassional helps appetite and releases stress       Allergies   Allergen Reactions    Diclofenac Shortness Of Breath, Swelling, Rash and Other (See Comments)     Throat swelling, tremors    Fluoxetine Anaphylaxis, Shortness Of Breath and Swelling masseter tone symmetric  Hearing symmetric    Sensation: decreased to bilateral 5th fingers  Ring finger split: negative    Motor  L deltoid 5/5; R deltoid 5/5  L biceps 5/5; R biceps 5/5  L triceps 5/5; R triceps 5/5  L wrist extension 5/5; R wrist extension 5/5  L intrinsics 5/5; R intrinsics 5/5     L iliopsoas 5/5 , R iliopsoas 5/5  L quadriceps 5/5; R quadriceps 5/5  L Dorsiflexion 5/5; R dorsiflexion 5/5  L Plantarflexion 5/5; R plantarflexion 5/5  L EHL 5/5; R EHL 5/5    Reflexes  L Brachioradialis 2+/4; R brachioradialis 2+/4  L Biceps 3+/4; R Biceps 3+/4  L Triceps 2+/4; R Triceps 2+/4  L Patellar 3+/4: R Patellar 3+/4  L Achilles 3+/4; R Achilles 3+/4     hoffmans L: neg  hoffmans R: neg  Clonus L: +1 beat  Clonus R: +1 beat  Babinski L: neg  Babinski R: neg    Studies Review:     MRI cervical spine 7/20/2020:  BONES/ALIGNMENT: Anterior fusion hardware is seen involving the C5 through C7    levels.  The vertebral body heights appear maintained.  There is no evidence    of spondylolisthesis.  Minimal Modic type 1 degenerative endplate change at    O8-W0.  Otherwise, the bone marrow signal demonstrates no acute abnormality.         SPINAL CORD: No abnormal cord signal is seen.         SOFT TISSUES: No paraspinal mass identified.         C2-C3: There is a disc bulge indenting on the ventral thecal sac.  No    significant spinal canal stenosis.  Uncovertebral joint and facet arthrosis    contribute to moderate right neural foraminal narrowing.  The left neural    foramen appears patent.  This is similar to the prior exam.         C3-C4: There is a disc bulge indenting on the ventral thecal sac.  Minimal    spinal canal stenosis.  Uncovertebral joint and facet arthrosis contribute to    severe right and moderate left neural foraminal narrowing.  This is similar    to the prior exam.         C4-C5: There is a disc bulge flattening the ventral thecal sac.  No    significant spinal canal stenosis.  Uncovertebral  MRI THORACIC SPINE WO CONTRAST     Standing Status:   Future     Standing Expiration Date:   7/30/2021     Order Specific Question:   Reason for exam:     Answer:   evaluate for stenosis/compression        Electronically signed by GERARDO Thayer CNP on 7/30/2020 at 3:43 PM    Please note that this chart was generated using voice recognition Dragon dictation software. Although every effort was made to ensure the accuracy of this automated transcription, some errors in transcription may have occurred.

## 2020-08-11 ENCOUNTER — HOSPITAL ENCOUNTER (OUTPATIENT)
Dept: MRI IMAGING | Age: 50
Discharge: HOME | End: 2020-08-13

## 2020-08-11 PROCEDURE — 72146 MRI CHEST SPINE W/O DYE: CPT

## 2020-08-18 ENCOUNTER — OFFICE VISIT (OUTPATIENT)
Dept: FAMILY MEDICINE CLINIC | Age: 50
End: 2020-08-18

## 2020-08-18 VITALS
OXYGEN SATURATION: 99 % | DIASTOLIC BLOOD PRESSURE: 96 MMHG | WEIGHT: 125 LBS | TEMPERATURE: 97.1 F | SYSTOLIC BLOOD PRESSURE: 139 MMHG | HEIGHT: 65 IN | HEART RATE: 66 BPM | BODY MASS INDEX: 20.83 KG/M2

## 2020-08-18 PROCEDURE — 99214 OFFICE O/P EST MOD 30 MIN: CPT | Performed by: NURSE PRACTITIONER

## 2020-08-18 RX ORDER — GABAPENTIN 100 MG/1
100 CAPSULE ORAL 2 TIMES DAILY
Qty: 180 CAPSULE | Refills: 1 | Status: SHIPPED | OUTPATIENT
Start: 2020-08-18 | End: 2020-11-19 | Stop reason: SDUPTHER

## 2020-08-18 RX ORDER — TERBINAFINE HYDROCHLORIDE 250 MG/1
250 TABLET ORAL DAILY
Qty: 90 TABLET | Refills: 0 | Status: SHIPPED | OUTPATIENT
Start: 2020-08-18 | End: 2020-11-16

## 2020-08-18 ASSESSMENT — ENCOUNTER SYMPTOMS
SORE THROAT: 0
RHINORRHEA: 0
DIARRHEA: 0
CONSTIPATION: 0
COUGH: 0
SHORTNESS OF BREATH: 0

## 2020-08-18 NOTE — PROGRESS NOTES
continued blotch ness that appearance her arms and leg     Dark yellow toenails with a hardened yellow crusting       Neurological: Positive for dizziness and syncope. Negative for weakness and headaches. Psychiatric/Behavioral: Negative for confusion. The patient is not nervous/anxious. HISTORY OF PRESENT ILLNESS     Patient is a 51-year-old female with known postural orthostatic tachycardia syndrome. Notes that she has been having and experiencing continued fatigue and dizziness. Denies any rapid chest rate. Patient was recently trialed on Cymbalta at a low dose to see if this would help with both neuropathic pain resulting from her chronic neck pain and with issues with depression and anxiety. Patient reports that she was able to take 1 dosage however second dosage caused her to be increasingly fatigued and dizzy. Has since reduced back down to 1 dosage. At this time we discussed discontinuing it. Would like to trial patient back on gabapentin for cervical radiculopathy. And pain. Patient is to titrate off of the Cymbalta every other day and will continue to be monitored. In addition patient had complaints of yellowing toes with hardening. And noted this is been going on for several years. There does appear to be some fungal involvement. However more concerning is that patient has got a blotchy appearance almost a lacy type appearance of the lower extremities in regards to vascular blood flow. I discussed having an TODD completed to verify if there is anything that we needed to do regarding vascular. As patient does appear to have lessening her flow in the lower extremities and with the hardening of the toenails there is some concern for some decreased vascularization. Orders will be placed and we will discuss with patient at the next appointment.       PHYSICAL EXAM:     BP (!) 139/96 (Site: Right Upper Arm, Position: Sitting, Cuff Size: Medium Adult)   Pulse 66   Temp 97.1 °F Dispense: 180 capsule; Refill: 1    2. S/P cervical spinal fusion  Start medication   - gabapentin (NEURONTIN) 100 MG capsule; Take 1 capsule by mouth 2 times daily for 180 days. Intended supply: 90 days  Dispense: 180 capsule; Refill: 1    3. Dizziness  Stop cymbalta    4. Fungal infection of toenail  Start medication  - terbinafine (LAMISIL) 250 MG tablet; Take 1 tablet by mouth daily  Dispense: 90 tablet; Refill: 0  - Comprehensive Metabolic Panel; Future    5. Bilateral lower extremity pain  Complete testing  - VL LOWER EXTREMITY ARTERIAL SEGMENTAL PRESSURES W PPG; Future      Return in about 6 weeks (around 9/29/2020). COMMUNICATION:           The best way to find yourself is to lose yourself in the service of others - [id number] [facility]   [email or web address]  Office: [phone]   Cell: [phone]    Electronically signed by SENA Baldwin on 8/26/2020 at 5:36 PM

## 2020-08-26 ENCOUNTER — HOSPITAL ENCOUNTER (OUTPATIENT)
Dept: VASCULAR LAB | Facility: CLINIC | Age: 50
Discharge: HOME | End: 2020-08-26

## 2020-08-26 PROCEDURE — 93923 UPR/LXTR ART STDY 3+ LVLS: CPT

## 2020-08-29 ENCOUNTER — PATIENT MESSAGE (OUTPATIENT)
Dept: NEUROSURGERY | Age: 50
End: 2020-08-29

## 2020-08-31 NOTE — TELEPHONE ENCOUNTER
From: Tavon Powell  To: GERARDO Nino - CNP  Sent: 8/29/2020 1:49 AM EDT  Subject: Visit Follow-Up Question    I have been having consistent pain, stiffness and mobility issues with my right arm. The pain starts at the top of my shoulder near my neck to my forearm. Most of the pain is in the bicep area. Having difficulty using my arm without increasing pain.

## 2020-09-01 NOTE — TELEPHONE ENCOUNTER
Could consider referral to pain management to discuss injections to help with the symptom management/pain relief

## 2020-09-14 ENCOUNTER — OFFICE VISIT (OUTPATIENT)
Dept: NEUROSURGERY | Age: 50
End: 2020-09-14

## 2020-09-14 VITALS
SYSTOLIC BLOOD PRESSURE: 152 MMHG | DIASTOLIC BLOOD PRESSURE: 93 MMHG | HEART RATE: 73 BPM | BODY MASS INDEX: 19.99 KG/M2 | HEIGHT: 65 IN | TEMPERATURE: 96 F | WEIGHT: 120 LBS | OXYGEN SATURATION: 99 %

## 2020-09-14 PROCEDURE — 99213 OFFICE O/P EST LOW 20 MIN: CPT | Performed by: NEUROLOGICAL SURGERY

## 2020-09-14 RX ORDER — ACETAMINOPHEN 325 MG/1
650 TABLET ORAL EVERY 6 HOURS PRN
COMMUNITY

## 2020-09-14 NOTE — PROGRESS NOTES
Brad Chiu  [facility] [place] [address] [facility] [address]  Dept: [phone]    Patient:  Jc Nava  YOB: 1970  Date: 9/14/20    The patient is a 48 y.o. female who presents today for consult of the following problems:     Chief Complaint   Patient presents with    Follow-up     Myelopathy    Results     MRI Thoracic 08/11/20             HPI:     Jc Nava is a 48 y.o. female on whom neurosurgical consultation was requested by GERARDO Petersen CNP for management of significant right upper extremity pain as well as ataxia and incontinence. The patient has a conglomerate of symptoms including numbness and tingling along with radiating ache in the right upper extremity shoulder upper extremity and lateral aspect of the forearm. Denies any distal numbness or tingling in the hand or left upper extremity issues. Some ataxia as well as urge incontinence where she feels like she has to go and cannot empty her bladder. Mild axial neck pain as well. Denies any recent trauma falls or accidents. Did have some improvement in her symptoms after surgery as well. Vito Bledsoe       History:     Past Medical History:   Diagnosis Date    Chronic pain     POTS (postural orthostatic tachycardia syndrome)     Wears glasses      Past Surgical History:   Procedure Laterality Date    CERVICAL FUSION  08/29/2019     ANTERIOR CERVICAL DISCECTOMY, FUSION C5-7    CERVICAL FUSION N/A 8/29/2019    ANTERIOR CERVICAL DISCECTOMY, FUSION C5-7 performed by Denver Hale, DO at [address], 2000    x2    HYSTERECTOMY  2006    ovaries remain    TONSILLECTOMY       Family History   Problem Relation Age of Onset    Brain Cancer Mother     Stroke Mother     Heart Disease Mother         stents    Hypertension Father     High Blood Pressure Brother     Cancer Maternal Grandmother         UNKNOWN SITES    Heart Disease Maternal Grandfather  Heart Attack Maternal Grandfather      Current Outpatient Medications on File Prior to Visit   Medication Sig Dispense Refill    acetaminophen (TYLENOL) 325 MG tablet Take 650 mg by mouth every 6 hours as needed for Pain      gabapentin (NEURONTIN) 100 MG capsule Take 1 capsule by mouth 2 times daily for 180 days. Intended supply: 90 days 180 capsule 1    terbinafine (LAMISIL) 250 MG tablet Take 1 tablet by mouth daily 90 tablet 0    DULoxetine (CYMBALTA) 30 MG extended release capsule Take 2 capsules by mouth daily (Patient taking differently: Take 30 mg by mouth every other day ) 60 capsule 3     No current facility-administered medications on file prior to visit. Social History     Tobacco Use    Smoking status: Current Some Day Smoker     Packs/day: 0.50     Years: 30.00     Pack years: 15.00     Types: Cigarettes     Start date: 1985    Smokeless tobacco: Never Used    Tobacco comment: Cut Down half of pack patient has tried multiple times to quit    Substance Use Topics    Alcohol use: Yes     Frequency: Monthly or less     Drinks per session: 1 or 2     Binge frequency: Never     Comment: 1 or 2 beers occasionally not very often     Drug use: Yes     Types: Marijuana     Comment: ocassional helps appetite and releases stress       Allergies   Allergen Reactions    Diclofenac Shortness Of Breath, Swelling, Rash and Other (See Comments)     Throat swelling, tremors    Fluoxetine Anaphylaxis, Shortness Of Breath and Swelling     Throat swelling    Prozac [Fluoxetine Hcl] Shortness Of Breath and Swelling     Throat swelling       Review of Systems  Constitutional: Negative for activity change and appetite change. HENT: Negative for ear pain and facial swelling. Eyes: Negative for discharge and itching. Respiratory: Negative for choking and chest tightness. Cardiovascular: Negative for chest pain and leg swelling. Gastrointestinal: Negative for nausea and abdominal pain. Endocrine: Negative for cold intolerance and heat intolerance. Genitourinary: Negative for frequency and flank pain. Musculoskeletal: Negative for myalgias and joint swelling. Skin: Negative for rash and wound. Allergic/Immunologic: Negative for environmental allergies and food allergies. Hematological: Negative for adenopathy. Does not bruise/bleed easily. Psychiatric/Behavioral: Negative for self-injury. The patient is not nervous/anxious. Physical Exam:      BP (!) 152/93 (Site: Right Upper Arm, Position: Sitting, Cuff Size: Medium Adult)   Pulse 73   Temp 96 °F (35.6 °C) (Temporal)   Ht 5' 5\" (1.651 m)   Wt 120 lb (54.4 kg)   LMP  (LMP Unknown) Comment: Dos not know LMS before hysterectomy   SpO2 99%   BMI 19.97 kg/m²   Estimated body mass index is 19.97 kg/m² as calculated from the following:    Height as of this encounter: 5' 5\" (1.651 m). Weight as of this encounter: 120 lb (54.4 kg). General:  Gini Agustin is a 48y.o. year old female who appears her stated age. HEENT: Normocephalic atraumatic. Neck supple. Chest: regular rate; pulses equal  Abdomen: Soft nontender nondistended. Normoactive bowel sounds.   Ext: DP and PT pulses 2+, good cap refill  Neuro    Mentation  Appropriate affect  Registration intact  Orientation intact  3 item recall intact  Judgement intact to situation    Cranial Nerves:   Pupils equal and reactive to light  Extraocular motion intact  Face and shrug symmetric  Tongue midline  No dysarthria  v1-3 sensation symmetric, masseter tone symmetric  Hearing symmetric and intact to finger rub    Sensation:   Intact    Motor  L deltoid 5/5; R deltoid 5/5  L biceps 5/5; R biceps 5/5  L triceps 5/5; R triceps 5/5  L wrist extension 5/5; R wrist extension 5/5  L intrinsics 5/5; R intrinsics 5/5     L iliopsoas 5/5 , R iliopsoas 5/5  L quadriceps 5/5; R quadriceps 5/5  L Dorsiflexion 5/5; R dorsiflexion 5/5  L Plantarflexion 5/5; R plantarflexion 5/5  L EHL 5/5; R EHL 5/5    Reflexes  Hyperreflexic with 3 out of 4 patellar's biceps triceps. Positive Marin's left    Clonus L: neg  Clonus R: neg  Babinski L: up  Babinski R; up    Studies Review:     MRI of the cervical and thoracic spine did not reveal any significant central stenosis. Assessment and Plan:      1. Urge incontinence of urine          Plan: Extensive discussion with the patient regarding the myriad of symptoms that she has. I did review the cervical and thoracic spine which shows no evidence ental stenosis or cord compression. Cervical MRI is actually significant improved from prior. At this point the patient's myriad of symptoms including what appears to be presyncope diffuse right upper extremity ache incontinence fatigue and generalized aches do not coincide with any specific neurological or or spinal pathology. I did order a lumbar MRI to rule out any lumbar compression of the cauda equina or the conus but I explained her that I do not have a high level of concern that this is spinal neuropathic in origin. Unfortunately she appears to have a difficult to diagnose likely organic condition that may be rheumatologic in origin related to the pots. I explained her that I will directly speak with her PCP and advise external referral or internal referral to rheumatology as deemed fit. Followup: No follow-ups on file. Prescriptions Ordered:  No orders of the defined types were placed in this encounter. Orders Placed:  Orders Placed This Encounter   Procedures    MRI LUMBAR SPINE WO CONTRAST     Standing Status:   Future     Standing Expiration Date:   9/14/2021     Order Specific Question:   Reason for exam:     Answer:   INCONTINENCE AND ATAXIA        Electronically signed by Joanne Wagner DO on 9/14/2020 at 11:37 AM    Please note that this chart was generated using voice recognition Dragon dictation software.   Although every effort was made to ensure the accuracy of this automated transcription, some errors in transcription may have occurred.

## 2020-09-18 ENCOUNTER — PATIENT MESSAGE (OUTPATIENT)
Dept: FAMILY MEDICINE CLINIC | Age: 50
End: 2020-09-18

## 2020-09-18 NOTE — TELEPHONE ENCOUNTER
From: Denis Shukla  To: Gia Shannon, GERARDO - CNP  Sent: 9/18/2020 2:19 PM EDT  Subject: Non-Urgent Medical Question    Reaching out to you for help if possible. I seem to be hitting a dead end with my functional capacity test. I have a disability hearing on the 23rd of this month. I know we don't have all of the answers regarding my health and will be having future testing and appointments. I am still passing out with all of my other issues. I am asking you if you could possibly write a letter regarding my health. I am desperate and running out of time. I know it's alot to ask for but it would be greatly appreciated.    Thank you in ASU:

## 2020-09-25 ENCOUNTER — HOSPITAL ENCOUNTER (OUTPATIENT)
Age: 50
Setting detail: SPECIMEN
Discharge: HOME | End: 2020-09-25

## 2020-09-25 LAB
ALBUMIN SERPL-MCNC: 4.3 G/DL (ref 3.5–5.2)
ALBUMIN/GLOBULIN RATIO: 1.7 (ref 1–2.5)
ALP BLD-CCNC: 77 U/L (ref 35–104)
ALT SERPL-CCNC: 15 U/L (ref 5–33)
ANION GAP SERPL CALCULATED.3IONS-SCNC: 12 MMOL/L (ref 9–17)
AST SERPL-CCNC: 19 U/L
BILIRUB SERPL-MCNC: 0.32 MG/DL (ref 0.3–1.2)
BUN BLDV-MCNC: 13 MG/DL (ref 6–20)
BUN/CREAT BLD: NORMAL (ref 9–20)
CALCIUM SERPL-MCNC: 9.1 MG/DL (ref 8.6–10.4)
CHLORIDE BLD-SCNC: 106 MMOL/L (ref 98–107)
CO2: 20 MMOL/L (ref 20–31)
CREAT SERPL-MCNC: 0.55 MG/DL (ref 0.5–0.9)
GFR AFRICAN AMERICAN: >60 ML/MIN
GFR NON-AFRICAN AMERICAN: >60 ML/MIN
GFR SERPL CREATININE-BSD FRML MDRD: NORMAL ML/MIN/{1.73_M2}
GFR SERPL CREATININE-BSD FRML MDRD: NORMAL ML/MIN/{1.73_M2}
GLUCOSE BLD-MCNC: 73 MG/DL (ref 70–99)
POTASSIUM SERPL-SCNC: 4.5 MMOL/L (ref 3.7–5.3)
SODIUM BLD-SCNC: 138 MMOL/L (ref 135–144)
TOTAL PROTEIN: 6.8 G/DL (ref 6.4–8.3)

## 2020-09-29 ENCOUNTER — OFFICE VISIT (OUTPATIENT)
Dept: FAMILY MEDICINE CLINIC | Age: 50
End: 2020-09-29

## 2020-09-29 VITALS
WEIGHT: 127 LBS | HEIGHT: 65 IN | DIASTOLIC BLOOD PRESSURE: 68 MMHG | BODY MASS INDEX: 21.16 KG/M2 | OXYGEN SATURATION: 99 % | SYSTOLIC BLOOD PRESSURE: 114 MMHG | HEART RATE: 83 BPM | TEMPERATURE: 96.5 F

## 2020-09-29 PROCEDURE — 90471 IMMUNIZATION ADMIN: CPT | Performed by: NURSE PRACTITIONER

## 2020-09-29 PROCEDURE — 99214 OFFICE O/P EST MOD 30 MIN: CPT | Performed by: NURSE PRACTITIONER

## 2020-09-29 PROCEDURE — 90686 IIV4 VACC NO PRSV 0.5 ML IM: CPT | Performed by: NURSE PRACTITIONER

## 2020-09-29 RX ORDER — CYCLOBENZAPRINE HCL 5 MG
5 TABLET ORAL 2 TIMES DAILY PRN
Qty: 30 TABLET | Refills: 0 | Status: SHIPPED | OUTPATIENT
Start: 2020-09-29 | End: 2021-03-17 | Stop reason: SDUPTHER

## 2020-09-29 RX ORDER — FLUTICASONE PROPIONATE 50 MCG
2 SPRAY, SUSPENSION (ML) NASAL DAILY
Qty: 1 BOTTLE | Refills: 5 | Status: SHIPPED | OUTPATIENT
Start: 2020-09-29 | End: 2020-11-19 | Stop reason: SDUPTHER

## 2020-09-29 ASSESSMENT — ENCOUNTER SYMPTOMS
BACK PAIN: 1
DIARRHEA: 1
SORE THROAT: 0
RHINORRHEA: 0
CONSTIPATION: 0
SHORTNESS OF BREATH: 0
COUGH: 0

## 2020-09-29 NOTE — PROGRESS NOTES
[facility]   [address]   [phone]    9/29/2020     Patient ID: Dimple Lackey is a 48 y.o. female. CHIEF COMPLAINT:     Dimple Lackey presents today for her medical conditions/complaints as noted below. Elle Johnson is c/o of Follow-up; Discuss Labs; Numbness (left lower buttox); and Arm Pain (x 1 1/2 months when lifting )  . REVIEWED:      [x] Past Medical, Family, and Social History was reviewed per writer and does contribute to the patient presenting condition.     [x] Laboratory Results, Vital signs, Imaging, Active Problems, Immunizations, Current/Recently Discontinued Medications, Health Maintenance Activities Due, Referral Notes (if available) were reviewed per writer     [x] Reviewed Depression screening if taken or valid today or any other valid screening tool (others seen below) Interpretation of Total Score DepressionSeverity: 1-4 = Minimal depression, 5-9 = Mild depression, 10-14 = Moderate depression, 15-19 = Moderately severe depression, 20-27 =Severe depression    PHQ Scores 7/6/2020 2/5/2019 8/14/2018   PHQ2 Score 5 0 0   PHQ9 Score 23 0 0       Interpretation of Total Score DepressionSeverity: 1-4 = Minimal depression, 5-9 = Mild depression, 10-14 = Moderate depression, 15-19 = Moderately severe depression, 20-27 = Severe depression    Allergies   Allergen Reactions    Diclofenac Shortness Of Breath, Swelling, Rash and Other (See Comments)     Throat swelling, tremors    Fluoxetine Anaphylaxis, Shortness Of Breath and Swelling     Throat swelling    Prozac [Fluoxetine Hcl] Shortness Of Breath and Swelling     Throat swelling     Current Outpatient Medications   Medication Sig Dispense Refill    fluticasone (FLONASE) 50 MCG/ACT nasal spray 2 sprays by Each Nostril route daily 1 Bottle 5    cyclobenzaprine (FLEXERIL) 5 MG tablet Take 1 tablet by mouth 2 times daily as needed for Muscle spasms 30 tablet 0    acetaminophen (TYLENOL) 325 MG tablet Take 650 mg by mouth every 6 hours as needed for Pain      gabapentin (NEURONTIN) 100 MG capsule Take 1 capsule by mouth 2 times daily for 180 days. Intended supply: 90 days 180 capsule 1    terbinafine (LAMISIL) 250 MG tablet Take 1 tablet by mouth daily 90 tablet 0    DULoxetine (CYMBALTA) 30 MG extended release capsule Take 2 capsules by mouth daily (Patient taking differently: Take 30 mg by mouth every other day ) 60 capsule 3     No current facility-administered medications for this visit.       Past Medical History:   Diagnosis Date    Chronic pain     POTS (postural orthostatic tachycardia syndrome)     Wears glasses       Past Surgical History:   Procedure Laterality Date    CERVICAL FUSION  08/29/2019     ANTERIOR CERVICAL DISCECTOMY, FUSION C5-7    CERVICAL FUSION N/A 8/29/2019    ANTERIOR CERVICAL DISCECTOMY, FUSION C5-7 performed by Manisha Esposito DO at [address] , 2000    x2    HYSTERECTOMY  2006    ovaries remain    TONSILLECTOMY       Family History   Problem Relation Age of Onset    Brain Cancer Mother     Stroke Mother     Heart Disease Mother         stents    Hypertension Father     High Blood Pressure Brother     Cancer Maternal Grandmother         UNKNOWN SITES    Heart Disease Maternal Grandfather     Heart Attack Maternal Grandfather      Social History     Tobacco Use    Smoking status: Current Some Day Smoker     Packs/day: 0.50     Years: 30.00     Pack years: 15.00     Types: Cigarettes     Start date: 1985    Smokeless tobacco: Never Used    Tobacco comment: Cut Down half of pack patient has tried multiple times to quit    Substance Use Topics    Alcohol use: Yes     Frequency: Monthly or less     Drinks per session: 1 or 2     Binge frequency: Never     Comment: 1 or 2 beers occasionally not very often         Patient Care Team:    REVIEW OF SYSTEMS:     Review of Systems   Constitutional: Negative for activity change, fatigue and unexpected weight change. HENT: Negative for congestion, ear pain, hearing loss, rhinorrhea and sore throat. Respiratory: Negative for cough and shortness of breath. Cardiovascular: Negative for chest pain, palpitations and leg swelling. Neurogenic syncope   Gastrointestinal: Positive for diarrhea. Negative for constipation. Musculoskeletal: Positive for back pain and neck pain. Negative for arthralgias and gait problem. Skin:        Has ca continued blotch ness that appearance her arms and leg     Dark yellow toenails with a hardened yellow crusting       Neurological: Positive for dizziness, syncope and weakness. Negative for headaches. Psychiatric/Behavioral: Negative for confusion. The patient is not nervous/anxious. HISTORY OF PRESENT ILLNESS     VERTIGO/DIZZINESS    Patient presenting for evaluation of dizziness and vertigo. Onset of symptoms was 4  week(s) ago. Aggravating symptoms include movement. Patient does not have symptoms of nausea. Treatment prior to arrival includes none with no improvement . Patient has history of no recent travel, recent surgery, no recent surgey, leg swelling/pain, no leg swelling/pain and estrogen medications. Patient has undergone has not been evaluated by neurology. Arm Pain    The incident occurred more than 1 week ago. Incident location: over use  There was no injury mechanism. The pain is present in the right shoulder and right forearm. The quality of the pain is described as aching (sharp with movement ). The pain is at a severity of 9/10 (with movement). The pain has been constant since the incident. Pertinent negatives include no chest pain. The symptoms are aggravated by movement. She has tried ice, immobilization, rest, NSAIDs and acetaminophen for the symptoms. The treatment provided no relief. Back Pain   This is a chronic problem. The current episode started more than 1 year ago. The problem occurs constantly. The problem is unchanged. The pain is present in the lumbar spine and sacro-iliac. The quality of the pain is described as shooting. The pain radiates to the right thigh. The pain is at a severity of 9/10. The pain is severe. The pain is the same all the time. The symptoms are aggravated by bending, position, sitting, standing and stress. Associated symptoms include weakness. Pertinent negatives include no chest pain or headaches. Risk factors include sedentary lifestyle. She has tried NSAIDs, muscle relaxant, ice, home exercises, bed rest and analgesics (cervical surgery ) for the symptoms. The treatment provided no relief. Neck Pain    This is a chronic problem. The current episode started more than 1 year ago. The problem occurs constantly. The problem has been waxing and waning. The pain is associated with an unknown factor. The pain is present in the midline. The pain is at a severity of 5/10. The pain is moderate. The symptoms are aggravated by position, twisting and stress. The pain is same all the time. Stiffness is present all day. Associated symptoms include weakness. Pertinent negatives include no chest pain or headaches. She has tried muscle relaxants, neck support, home exercises and oral narcotics (surgical intervention ) for the symptoms. The treatment provided mild relief. Hypertension   Associated symptoms include neck pain. Pertinent negatives include no chest pain, headaches, palpitations or shortness of breath. Smoking cessation: discussed briefly,  Not ready to stop at this poin. t 10 Cig per day. Has appointment with Dr. Esme Cerrato. PHYSICAL EXAM:     /68 (Site: Left Upper Arm, Position: Sitting, Cuff Size: Medium Adult)   Pulse 83   Temp 96.5 °F (35.8 °C)   Ht 5' 5\" (1.651 m)   Wt 127 lb (57.6 kg)   LMP  (LMP Unknown) Comment: Dos not know LMS before hysterectomy   SpO2 99%   BMI 21.13 kg/m²    Physical Exam  Vitals signs reviewed. Constitutional:       Appearance: Normal appearance.  She is not ill-appearing. HENT:      Head: Normocephalic and atraumatic. Eyes:      Extraocular Movements: Extraocular movements intact. Pupils: Pupils are equal, round, and reactive to light. Neck:      Musculoskeletal: Normal range of motion and neck supple. Vascular: No carotid bruit. Cardiovascular:      Rate and Rhythm: Normal rate and regular rhythm. Pulses: Normal pulses. Heart sounds: Normal heart sounds. No murmur. No friction rub. No gallop. Pulmonary:      Effort: Pulmonary effort is normal. No respiratory distress. Breath sounds: Normal breath sounds. No wheezing. Abdominal:      General: Abdomen is flat. Bowel sounds are normal. There is no distension. Palpations: Abdomen is soft. There is no mass. Tenderness: There is no abdominal tenderness. There is no right CVA tenderness or left CVA tenderness. Musculoskeletal:         General: No swelling. Right shoulder: She exhibits decreased range of motion, tenderness and pain. Lumbar back: She exhibits decreased range of motion, tenderness, pain and spasm. Right lower leg: No edema. Left lower leg: No edema. Skin:     General: Skin is warm. Capillary Refill: Capillary refill takes less than 2 seconds. Findings: No erythema, lesion or rash. Neurological:      General: No focal deficit present. Mental Status: She is alert and oriented to person, place, and time. Psychiatric:         Mood and Affect: Mood normal.         Behavior: Behavior normal. Behavior is cooperative. ASSESSMENT /PLAN     1. Screen for colon cancer  -complete testing as ordered    - POCT Fecal Immunochemical Test (FIT); Future    2. Vertigo  -start medication as discussed and ordered    - fluticasone (FLONASE) 50 MCG/ACT nasal spray; 2 sprays by Each Nostril route daily  Dispense: 1 Bottle; Refill: 5    3.  Need for influenza vaccination  completed  - INFLUENZA, QUADV, 3 YRS AND OLDER, IM PF, PREFILL SYR OR SDV, 0.5ML (AFLURIA QUADV, PF)    4. Cervical paraspinal muscle spasm  Continued med    - cyclobenzaprine (FLEXERIL) 5 MG tablet; Take 1 tablet by mouth 2 times daily as needed for Muscle spasms  Dispense: 30 tablet; Refill: 0    5. Dizziness  Start med      6. Tobacco use disorder  Smoking cessation discussed     7. POTS (postural orthostatic tachycardia syndrome)  Stable       Return in about 3 months (around 12/29/2020) for recheck symptoms of today's primary complaint VERTIGO, POTS, BACK PAIN. COMMUNICATION:         The best way to find yourself is to lose yourself in the service of others - [address] [email or web address]  Office: [phone]   Cell: [phone]    Electronically signed by SENA Scherer on 10/1/2020 at 12:37 AM

## 2020-09-29 NOTE — PATIENT INSTRUCTIONS
It was my pleasure to meet with you today. Please contact me with any questions or concerns, and please notify myself or our manger if there is anyway we can improve our service in your health care needs. Below I have listed some instructions and information that pertain to today's visit.    -You have been advised to continue all current medication, otherwise not discussed in today's visit  -New medications and refills will been sent and made available at pharmacy or mail away  -Heathy daily diet to include low salt and low carbohydrate, low sugar diabetic diet  -Drink 6-8 glasses of water daily      Patient Education        Influenza (Flu) Vaccine: Care Instructions  Your Care Instructions     Influenza (flu) is an infection in the lungs and breathing passages. It is caused by the influenza virus. There are different strains, or types, of the flu virus every year. The flu comes on quickly. It can cause a cough, stuffy nose, fever, chills, tiredness, and aches and pains. These symptoms may last up to 10 days. The flu can make you feel very sick, but most of the time it doesn't lead to other problems. But it can cause serious problems in people who are older or who have a long-term illness, such as heart disease or diabetes. You can help prevent the flu by getting a flu vaccine every year, as soon as it is available. You cannot get the flu from the vaccine. The vaccine prevents most cases of the flu. But even when the vaccine doesn't prevent the flu, it can make symptoms less severe and reduce the chance of problems from the flu. Sometimes, young children and people who have an immune system problem may have a slight fever or muscle aches or pains 6 to 12 hours after getting the shot. These symptoms usually last 1 or 2 days. Follow-up care is a key part of your treatment and safety. Be sure to make and go to all appointments, and call your doctor if you are having problems.  It's also a good idea to know your test medicines have acetaminophen, which is Tylenol. Too much acetaminophen (Tylenol) can be harmful. When should you call for help? [id number] anytime you think you may need emergency care. For example, call if after getting the flu vaccine:  · You have symptoms of a severe reaction to the flu vaccine. Symptoms of a severe reaction may include:  ? Severe difficulty breathing. ? Sudden raised, red areas (hives) all over your body. ? Severe lightheadedness. Call your doctor now or seek immediate medical care if after getting the flu vaccine:  · You think you are having a reaction to the flu vaccine, such as a new fever. Watch closely for changes in your health, and be sure to contact your doctor if you have any problems. Where can you learn more? Go to https://utoopia.[employer]. org and sign in to your [employer] account. Enter G433 in the Gengo box to learn more about \"Influenza (Flu) Vaccine: Care Instructions. \"     If you do not have an account, please click on the \"Sign Up Now\" link. Current as of: December 9, 2019               Content Version: 12.5  © 4062-4938 [employer]wise, Incorporated. Care instructions adapted under license by [facility] ([facility]). If you have questions about a medical condition or this instruction, always ask your healthcare professional. [address] any warranty or liability for your use of this information.

## 2020-09-30 ENCOUNTER — INITIAL CONSULT (OUTPATIENT)
Dept: PAIN MANAGEMENT | Age: 50
End: 2020-09-30

## 2020-09-30 VITALS
TEMPERATURE: 97 F | WEIGHT: 127.8 LBS | SYSTOLIC BLOOD PRESSURE: 129 MMHG | DIASTOLIC BLOOD PRESSURE: 93 MMHG | HEIGHT: 65 IN | HEART RATE: 71 BPM | OXYGEN SATURATION: 100 % | BODY MASS INDEX: 21.29 KG/M2

## 2020-09-30 PROBLEM — M54.12 CHRONIC CERVICAL RADICULOPATHY: Status: ACTIVE | Noted: 2020-09-30

## 2020-09-30 PROBLEM — M48.02 CERVICAL SPINAL STENOSIS: Status: ACTIVE | Noted: 2020-09-30

## 2020-09-30 PROCEDURE — 99243 OFF/OP CNSLTJ NEW/EST LOW 30: CPT | Performed by: ANESTHESIOLOGY

## 2020-09-30 ASSESSMENT — ENCOUNTER SYMPTOMS
BACK PAIN: 1
EYE PAIN: 1
BLOOD IN STOOL: 1
RESPIRATORY NEGATIVE: 1

## 2020-09-30 NOTE — PROGRESS NOTES
The patient is a 48 y. o. Non-/non  female. Chief Complaint   Patient presents with    Neck Pain    Back Pain    Shoulder Pain    Consultation        HPI    Requesting physician for the evaluation of Rayna Mederos 1970: [address] [place] [address], APRN - [facility]     Pain History  Neck pain  Pain is chronic onset more than 1 year ago  No particular injury associated with the onset of this pain  Pain located over the cervical spine  Radiating down right arm particularly over the shoulder and upper arm  Associated symptoms include right arm numbness and tingling  No changes in bladder or bowel control  Pain is constant  Aggravated with neck movement and arm movement  Describes it as throbbing burning shooting sensation    Patient tried therapy 2019 and discontinued for pain aggravation  Had MRI cervical spine in 2019, report is available in epic  Was diagnosed with cervical spinal stenosis  Underwent cervical spine decompression surgery C5-C7 ACDF in August 2019  She reports some improvement in pain and weakness in right arm    She also continues to have neck and right upper arm residual pain after the surgery  This pain is constant and significantly interfere with her quality of life  She have an EMG nerve testing done before her surgery that showed cervical radiculopathy C5-C7 pain medication tried include NSAIDs, muscle relaxant, Cymbalta and gabapentin    Past medical history significant for pots syndrome  Pain score today  8  1. Location:Neck, back, right shoulder/arm  2. Radiation:shoulder pain radiates down arm, back pain radiates to rectum  3. Character:numbness,  5. Duration:1 years  10. Onset: years  7. Did an injury cause pain: no  8. Aggravating factors:position,sitting, standing, walking  9. Alleviating factors:nothing  10.  Associated symptoms (numbness / tingling / weakness):  yes  -Where at:right arm, fingers, toes  -Down into finger tips or toes (specify which finger or toes):middle fingers to both hands, right big toe  -constant or intermitting: intermittent  11. Red Flags: (weight loss / chills / loss of bladder or bowel control):loss of bladder control    Previous management history  1. Previous diagnostic workup: (Imaging/EMG)   CT, MRI, or Xray: MRI, xray  What part of the body:lumbar, thoracic, cervical  What facility did they have it at:[facility]  What year or specific date: 7940-1506  EMG:  yes    2. Previous non interventional treatments tried:  chiropractor or physical therapy: PT  What part of the body:neck   What facility was it done [facility]: [facility]  How long ago was it last tried:year  Did it work: No  Did they complete it:No     3. Previous Medications tried  NSAID's: yes  Neurontin: yes  Lyrica: no  Trycyclic antidepressant (Ellavil / Devon Wilsons ): no  Cymbalta: yes  Opioids (Ultram / Vicodin / Percocet / Morphine / Dilaudid / Oramorph/ Fentanyl etc.):  Last Pain medication taken (name of med and date): Gabapentin last taken today 9/30/20    4. Previous Interventional pain procedures tried:  What kind of injection:n/a  Who did the injection: n/a  did the injection help: n/a  Last time injection was done:N/A    5.  Previous surgeries for pain  What part of the body did they have the surgery:n/a  What physician did the surgery:n/a  What Facility did they have the surgery done:n/a  Date of Surgery:N/A    Social History:  Marital status:  Employment History:  Working  No  Full time Or Part time:   Disability  Yes   Legal Issues related to pain complaint: No     Pain Disability Index score :86    Informant: patient    Past Medical History:   Diagnosis Date    Chronic pain     POTS (postural orthostatic tachycardia syndrome)     Wears glasses       Past Surgical History:   Procedure Laterality Date    CERVICAL FUSION  08/29/2019     ANTERIOR CERVICAL DISCECTOMY, FUSION C5-7    CERVICAL FUSION N/A 8/29/2019    ANTERIOR CERVICAL DISCECTOMY, FUSION C5-7 performed by Esperanza Winchester DO at [address], 2000    x2    HYSTERECTOMY  2006    ovaries remain    TONSILLECTOMY       Social History     Socioeconomic History    Marital status:      Spouse name: Jennifer Gardner Number of children: 2    Years of education: None    Highest education level: None   Occupational History    None   Social Needs    Financial resource strain: Not hard at all   [place] insecurity     Worry: Never true     Inability: Never true   [employer] needs     Medical: No     Non-medical: No   Tobacco Use    Smoking status: Current Some Day Smoker     Packs/day: 0.50     Years: 30.00     Pack years: 15.00     Types: Cigarettes     Start date: 1985    Smokeless tobacco: Never Used    Tobacco comment: Cut Down half of pack patient has tried multiple times to quit    Substance and Sexual Activity    Alcohol use: Yes     Frequency: Monthly or less     Drinks per session: 1 or 2     Binge frequency: Never     Comment: 1 or 2 beers occasionally not very often     Drug use: Yes     Types: Marijuana     Comment: ocassional helps appetite and releases stress    Sexual activity: Yes     Partners: Male     Comment: Shanon April ()   Lifestyle    Physical activity     Days per week: 0 days     Minutes per session: 0 min    Stress:  To some extent   Relationships    Social connections     Talks on phone: Once a week     Gets together: Once a week     Attends Hindu service: Never     Active member of club or organization: No     Attends meetings of clubs or organizations: Never     Relationship status:     Intimate partner violence     Fear of current or ex partner: No     Emotionally abused: No     Physically abused: No     Forced sexual activity: No   Other Topics Concern    None   Social History Narrative    None     Family History   Problem Relation Age of Onset    Brain Cancer Mother     Stroke Mother     Heart Disease Mother         stents    Hypertension Father     High Blood Pressure Brother     Cancer Maternal Grandmother         UNKNOWN SITES    Heart Disease Maternal Grandfather     Heart Attack Maternal Grandfather      Allergies   Allergen Reactions    Diclofenac Shortness Of Breath, Swelling, Rash and Other (See Comments)     Throat swelling, tremors    Fluoxetine Anaphylaxis, Shortness Of Breath and Swelling     Throat swelling    Prozac [Fluoxetine Hcl] Shortness Of Breath and Swelling     Throat swelling     Diclofenac; Fluoxetine; and Prozac [fluoxetine hcl]   Vitals:    09/30/20 1228   BP: (!) 129/93   Pulse: 71   Temp: 97 °F (36.1 °C)   SpO2: 100%     Current Outpatient Medications   Medication Sig Dispense Refill    fluticasone (FLONASE) 50 MCG/ACT nasal spray 2 sprays by Each Nostril route daily 1 Bottle 5    cyclobenzaprine (FLEXERIL) 5 MG tablet Take 1 tablet by mouth 2 times daily as needed for Muscle spasms 30 tablet 0    acetaminophen (TYLENOL) 325 MG tablet Take 650 mg by mouth every 6 hours as needed for Pain      gabapentin (NEURONTIN) 100 MG capsule Take 1 capsule by mouth 2 times daily for 180 days. Intended supply: 90 days 180 capsule 1    terbinafine (LAMISIL) 250 MG tablet Take 1 tablet by mouth daily 90 tablet 0    DULoxetine (CYMBALTA) 30 MG extended release capsule Take 2 capsules by mouth daily (Patient taking differently: Take 30 mg by mouth every other day ) 60 capsule 3     No current facility-administered medications for this visit. Review of Systems   Constitutional: Positive for fatigue. Negative for fever. HENT: Negative. Eyes: Positive for pain. Respiratory: Negative. Cardiovascular: Positive for palpitations. Gastrointestinal: Positive for blood in stool. Endocrine: Positive for cold intolerance and heat intolerance. Genitourinary: Positive for difficulty urinating. Musculoskeletal: Positive for arthralgias, back pain, joint swelling, myalgias, neck pain and neck stiffness. Skin: Negative. Allergic/Immunologic: Positive for environmental allergies. Neurological: Positive for dizziness, numbness and headaches. Hematological: Bruises/bleeds easily. Psychiatric/Behavioral: Negative. All other systems reviewed and are negative. Objective:  General Appearance:  Well-appearing, in no acute distress, uncomfortable and in pain. Vital signs: (most recent): Blood pressure (!) 129/93, pulse 71, temperature 97 °F (36.1 °C), temperature source Temporal, height 5' 5\" (1.651 m), weight 127 lb 12.8 oz (58 kg), SpO2 100 %. Vital signs are normal.  No fever. Output: Producing urine and producing stool. HEENT: Normal HEENT exam.    Lungs:  Normal effort and normal respiratory rate. Breath sounds clear to auscultation. She is not in respiratory distress. Heart: Normal rate. Extremities: Normal range of motion. There is no deformity. Neurological: Patient is alert and oriented to person, place and time. Patient has normal coordination. Pupils:  Pupils are equal, round, and reactive to light. Pupils are equal.   Skin:  Warm and dry. No rash or cyanosis.       Cervical spine examination  No apparent deformity on inspection  Any motion is limited and associated with pain  Positive tenderness to palpation over cervical spine  Spurling maneuver positive  Pain aggravation with right lateral bending    Neurological examination  Gait stable  Muscle mass, normal muscle tone, muscle strength upper 5 in both upper extremities in all major muscle group  Deep tendon reflexes are 2+ and symmetric    Assessment & Plan     Pain History  Neck pain  Pain is chronic onset more than 1 year ago  No particular injury associated with the onset of this pain  Pain located over the cervical spine  Radiating down right arm particularly over the shoulder and upper arm  Associated symptoms include right arm numbness and tingling  No changes in bladder or bowel control  Pain is constant  Aggravated with neck movement and arm movement  Describes it as throbbing burning shooting sensation    Patient tried therapy 2019 and discontinued for pain aggravation  Had MRI cervical spine in 2019, report is available in epic  Was diagnosed with cervical spinal stenosis  Underwent cervical spine decompression surgery C5-C7 ACDF in August 2019  She reports some improvement in pain and weakness in right arm    She also continues to have neck and right upper arm residual pain after the surgery  This pain is constant and significantly interfere with her quality of life  She have an EMG nerve testing done before her surgery that showed cervical radiculopathy C5-C7 pain medication tried include NSAIDs, muscle relaxant, Cymbalta and gabapentin    Past medical history significant for pots syndrome  Pain score today  8      1. Chronic cervical radiculopathy    2. S/P cervical spinal fusion    3.  Cervical spinal stenosis        Cervical spine 2019  Report was reviewed  It showed moderate spinal stenosis at the C5-C6 and C6/C7 level and right-sided foraminal narrowing    She underwent cervical spine decompression surgery ACDF C5-C7    For residual and ongoing pain issues she had a new MRI cervical spine recently  Report was reviewed  Findings discussed with patient    This new imaging showed improvement in pathology at the operated level    She continues to show severe stenosis of right foraminal at C3-C4 level    I explained to patient that there are 2 potential etiology 1 cervical radiculopathy at her operated level that was documented on her EMG is causing her residual pain but since her pain is mainly located over the neck and shoulder and upper arm then I think the right-sided C3-C4 level severe foraminal narrowing is causing her severe pain    I think she can benefit from an epidural steroid injection  Patient do not have any insurance and is concerned about the cost of the procedure and want to wait until she gets insurance    If

## 2020-10-06 LAB
CONTROL: POSITIVE
HEMOCCULT STL QL: POSITIVE

## 2020-10-06 PROCEDURE — 82274 ASSAY TEST FOR BLOOD FECAL: CPT | Performed by: NURSE PRACTITIONER

## 2020-11-19 RX ORDER — FLUTICASONE PROPIONATE 50 MCG
2 SPRAY, SUSPENSION (ML) NASAL DAILY
Qty: 1 BOTTLE | Refills: 5 | Status: SHIPPED | OUTPATIENT
Start: 2020-11-19 | End: 2022-03-17

## 2020-11-19 RX ORDER — GABAPENTIN 100 MG/1
100 CAPSULE ORAL 2 TIMES DAILY
Qty: 180 CAPSULE | Refills: 1 | Status: SHIPPED | OUTPATIENT
Start: 2020-11-19 | End: 2020-12-17 | Stop reason: SDUPTHER

## 2020-12-17 ENCOUNTER — OFFICE VISIT (OUTPATIENT)
Dept: FAMILY MEDICINE CLINIC | Age: 50
End: 2020-12-17

## 2020-12-17 VITALS
HEART RATE: 78 BPM | TEMPERATURE: 97.6 F | BODY MASS INDEX: 22.63 KG/M2 | SYSTOLIC BLOOD PRESSURE: 112 MMHG | OXYGEN SATURATION: 99 % | WEIGHT: 136 LBS | DIASTOLIC BLOOD PRESSURE: 74 MMHG

## 2020-12-17 PROCEDURE — 99213 OFFICE O/P EST LOW 20 MIN: CPT | Performed by: NURSE PRACTITIONER

## 2020-12-17 RX ORDER — GABAPENTIN 300 MG/1
300 CAPSULE ORAL 2 TIMES DAILY
Qty: 180 CAPSULE | Refills: 1 | Status: SHIPPED | OUTPATIENT
Start: 2020-12-17 | End: 2021-06-14

## 2020-12-17 RX ORDER — METHYLPREDNISOLONE 4 MG/1
TABLET ORAL
Qty: 1 KIT | Refills: 0 | Status: SHIPPED | OUTPATIENT
Start: 2020-12-17 | End: 2020-12-23

## 2020-12-17 NOTE — PATIENT INSTRUCTIONS
Patient Education        Shoulder Bursitis: Exercises  Introduction  Here are some examples of exercises for you to try. The exercises may be suggested for a condition or for rehabilitation. Start each exercise slowly. Ease off the exercises if you start to have pain. You will be told when to start these exercises and which ones will work best for you. How to do the exercises  Posterior stretching exercise   1. Hold the elbow of your injured arm with your other hand. 2. Use your hand to pull your injured arm gently up and across your body. You will feel a gentle stretch across the back of your injured shoulder. 3. Hold for at least 15 to 30 seconds. Then slowly lower your arm. 4. Repeat 2 to 4 times. Up-the-back stretch   Your doctor or physical therapist may want you to wait to do this stretch until you have regained most of your range of motion and strength. You can do this stretch in different ways. Hold any of these stretches for at least 15 to 30 seconds. Repeat them 2 to 4 times. 1. Light stretch: Put your hand in your back pocket. Let it rest there to stretch your shoulder. 2. Moderate stretch: With your other hand, hold your injured arm (palm outward) behind your back by the wrist. Pull your arm up gently to stretch your shoulder. 3. Advanced stretch: Put a towel over your other shoulder. Put the hand of your injured arm behind your back. Now hold the back end of the towel. With the other hand, hold the front end of the towel in front of your body. Pull gently on the front end of the towel. This will bring your hand farther up your back to stretch your shoulder. Overhead stretch   1. Standing about an arm's length away, grasp onto a solid surface. You could use a countertop, a doorknob, or the back of a sturdy chair. 2. With your knees slightly bent, bend forward with your arms straight. Lower your upper body, and let your shoulders stretch.   3. As your shoulders are able to stretch farther, you may need to take a step or two backward. 4. Hold for at least 15 to 30 seconds. Then stand up and relax. If you had stepped back during your stretch, step forward so you can keep your hands on the solid surface. 5. Repeat 2 to 4 times. Shoulder flexion (lying down)   To make a wand for this exercise, use a piece of PVC pipe or a broom handle with the broom removed. Make the wand about a foot wider than your shoulders. 1. Lie on your back, holding a wand with both hands. Your palms should face down as you hold the wand. 2. Keeping your elbows straight, slowly raise your arms over your head. Raise them until you feel a stretch in your shoulders, upper back, and chest.  3. Hold for 15 to 30 seconds. 4. Repeat 2 to 4 times. Shoulder rotation (lying down)   To make a wand for this exercise, use a piece of PVC pipe or a broom handle with the broom removed. Make the wand about a foot wider than your shoulders. 1. Lie on your back. Hold a wand with both hands with your elbows bent and palms up. 2. Keep your elbows close to your body, and move the wand across your body toward the sore arm. 3. Hold for 8 to 12 seconds. 4. Repeat 2 to 4 times. Shoulder blade squeeze   1. Stand with your arms at your sides, and squeeze your shoulder blades together. Do not raise your shoulders up as you squeeze. 2. Hold 6 seconds. 3. Repeat 8 to 12 times. Shoulder flexor and extensor exercise   These are isometric exercises. That means you contract your muscles without actually moving. 1. Push forward (flex): Stand facing a wall or doorjamb, about 6 inches or less back. Hold your injured arm against your body. Make a closed fist with your thumb on top. Then gently push your hand forward into the wall with about 25% to 50% of your strength. Don't let your body move backward as you push. Hold for about 6 seconds. Relax for a few seconds. Repeat 8 to 12 times.   2. Push backward (extend): Stand with your back flat against a wall. Your upper arm should be against the wall, with your elbow bent 90 degrees (your hand straight ahead). Push your elbow gently back against the wall with about 25% to 50% of your strength. Don't let your body move forward as you push. Hold for about 6 seconds. Relax for a few seconds. Repeat 8 to 12 times. Scapular exercise: Wall push-ups   This exercise is best done with your fingers somewhat turned out, rather than straight up and down. 1. Stand facing a wall, about 12 inches to 18 inches away. 2. Place your hands on the wall at shoulder height. 3. Slowly bend your elbows and bring your face to the wall. Keep your back and hips straight. 4. Push back to where you started. 5. Repeat 8 to 12 times. 6. When you can do this exercise against a wall comfortably, you can try it against a counter. You can then slowly progress to the end of a couch, then to a sturdy chair, and finally to the floor. Scapular exercise: Retraction   For this exercise, you will need elastic exercise material, such as surgical tubing or Thera-Band. 1. Put the band around a solid object at about waist level. (A bedpost will work well.) Each hand should hold an end of the band. 2. With your elbows at your sides and bent to 90 degrees, pull the band back. Your shoulder blades should move toward each other. Then move your arms back where you started. 3. Repeat 8 to 12 times. 4. If you have good range of motion in your shoulders, try this exercise with your arms lifted out to the sides. Keep your elbows at a 90-degree angle. Raise the elastic band up to about shoulder level. Pull the band back to move your shoulder blades toward each other. Then move your arms back where you started. Internal rotator strengthening exercise   1. Start by tying a piece of elastic exercise material to a doorknob. You can use surgical tubing or Thera-Band. 2. Stand or sit with your shoulder relaxed and your elbow bent 90 degrees.  Your upper arm should rest comfortably against your side. Squeeze a rolled towel between your elbow and your body for comfort. This will help keep your arm at your side. 3. Hold one end of the elastic band in the hand of the painful arm. 4. Slowly rotate your forearm toward your body until it touches your belly. Slowly move it back to where you started. 5. Keep your elbow and upper arm firmly tucked against the towel roll or at your side. 6. Repeat 8 to 12 times. External rotator strengthening exercise   1. Start by tying a piece of elastic exercise material to a doorknob. You can use surgical tubing or Thera-Band. (You may also hold one end of the band in each hand.)  2. Stand or sit with your shoulder relaxed and your elbow bent 90 degrees. Your upper arm should rest comfortably against your side. Squeeze a rolled towel between your elbow and your body for comfort. This will help keep your arm at your side. 3. Hold one end of the elastic band with the hand of the painful arm. 4. Start with your forearm across your belly. Slowly rotate the forearm out away from your body. Keep your elbow and upper arm tucked against the towel roll or the side of your body until you begin to feel tightness in your shoulder. Slowly move your arm back to where you started. 5. Repeat 8 to 12 times. Follow-up care is a key part of your treatment and safety. Be sure to make and go to all appointments, and call your doctor if you are having problems. It's also a good idea to know your test results and keep a list of the medicines you take. Where can you learn more? Go to https://[employer]. org and sign in to your [employer] account. Enter K687 in the [employer] box to learn more about \"Shoulder Bursitis: Exercises. \"     If you do not have an account, please click on the \"Sign Up Now\" link. Current as of: March 2, 2020               Content Version: 12.6  © 6940-6787 [employer], [facility].    Care instructions adapted under license by [facility] ([facility]). If you have questions about a medical condition or this instruction, always ask your healthcare professional. [address] any warranty or liability for your use of this information.

## 2020-12-17 NOTE — PROGRESS NOTES
Surgical History:   Procedure Laterality Date    CERVICAL FUSION  08/29/2019     ANTERIOR CERVICAL DISCECTOMY, FUSION C5-7    CERVICAL FUSION N/A 8/29/2019    ANTERIOR CERVICAL DISCECTOMY, FUSION C5-7 performed by Mahamed Hall DO at [address] [facility], 2000    x2    HYSTERECTOMY  2006    ovaries remain    TONSILLECTOMY       Family History   Problem Relation Age of Onset    Brain Cancer Mother     Stroke Mother     Heart Disease Mother         stents    Hypertension Father     High Blood Pressure Brother     Cancer Maternal Grandmother         UNKNOWN SITES    Heart Disease Maternal Grandfather     Heart Attack Maternal Grandfather      Social History     Tobacco Use    Smoking status: Current Some Day Smoker     Packs/day: 0.50     Years: 30.00     Pack years: 15.00     Types: Cigarettes     Start date: 1985    Smokeless tobacco: Never Used    Tobacco comment: Cut Down half of pack patient has tried multiple times to quit    Substance Use Topics    Alcohol use: Yes     Frequency: Monthly or less     Drinks per session: 1 or 2     Binge frequency: Never     Comment: 1 or 2 beers occasionally not very often         REVIEW OF SYSTEMS:     Review of Systems   Constitutional: Positive for activity change and fatigue. Negative for unexpected weight change. HENT: Negative for congestion, ear pain, hearing loss, rhinorrhea and sore throat. Respiratory: Negative for cough and shortness of breath. Cardiovascular: Negative for chest pain, palpitations and leg swelling. Gastrointestinal: Negative for constipation and diarrhea. Musculoskeletal: Positive for arthralgias, back pain and neck pain. Negative for gait problem. Right shoulder pain     Neurological: Negative for dizziness, weakness and headaches. Psychiatric/Behavioral: Positive for sleep disturbance. Negative for confusion. The patient is nervous/anxious.         HISTORY OF PRESENT ILLNESS     Patient reports overall she has been doing well. She continues to have right arm and right shoulder pain, this has been monitored by neurosurgery with recommendation of procedure. However patient has been awaiting insurance which will not be place until June 2021      She reports continued problem sleeping. Denies any improvement with current medication - discussed sleep study, will wait for insurance    Right shoulder pain possible bursitis  Positive FIT test - noted recently - discussed referral for colonoscopy and work up with GI, patient has been told to notify office, now that insurance is in place to inquire about assistance and possible dating of current condition for coverage. Patient has no significant evidence of symptoms, reports that she has had hemorrhoids in the past, which we discussed can at time lead to a false positive on FIT testing. I still recommend that she be seen and evaluated for colonoscopy as she has not undergone a colonoscopy in the past. She has not significant personal or family history - new referral has been placed. PHYSICAL EXAM:     /74   Pulse 78   Temp 97.6 °F (36.4 °C) (Temporal)   Wt 136 lb (61.7 kg)   LMP  (LMP Unknown) Comment: Dos not know LMS before hysterectomy   SpO2 99%   BMI 22.63 kg/m²    Physical Exam  Vitals signs reviewed. Constitutional:       Appearance: Normal appearance. She is not ill-appearing. HENT:      Head: Normocephalic and atraumatic. Eyes:      Extraocular Movements: Extraocular movements intact. Pupils: Pupils are equal, round, and reactive to light. Neck:      Musculoskeletal: Normal range of motion and neck supple. Vascular: No carotid bruit. Cardiovascular:      Rate and Rhythm: Normal rate and regular rhythm. Pulses: Normal pulses. Heart sounds: Normal heart sounds. No murmur. No friction rub. No gallop. Pulmonary:      Effort: Pulmonary effort is normal. No respiratory distress.       Breath sounds: Normal breath sounds. No wheezing. Abdominal:      General: Abdomen is flat. Bowel sounds are normal. There is no distension. Palpations: Abdomen is soft. There is no mass. Tenderness: There is no abdominal tenderness. There is no right CVA tenderness or left CVA tenderness. Musculoskeletal:         General: No swelling. Right shoulder: She exhibits decreased range of motion, tenderness and pain. Lumbar back: She exhibits decreased range of motion, tenderness, pain and spasm. Right lower leg: No edema. Left lower leg: No edema. Skin:     General: Skin is warm. Capillary Refill: Capillary refill takes less than 2 seconds. Findings: No erythema, lesion or rash. Neurological:      General: No focal deficit present. Mental Status: She is alert and oriented to person, place, and time. Psychiatric:         Mood and Affect: Mood normal.         Behavior: Behavior normal. Behavior is cooperative. ASSESSMENT /PLAN     1. Cervical paraspinal muscle spasm  -healthy diet as discussed  -daily exercise with in physical limitations  -start medication as discussed and ordered    - gabapentin (NEURONTIN) 300 MG capsule; Take 1 capsule by mouth 2 times daily for 180 days. Intended supply: 90 days  Dispense: 180 capsule; Refill: 1    2. S/P cervical spinal fusion    - methylPREDNISolone (MEDROL DOSEPACK) 4 MG tablet; Take by mouth. Dispense: 1 kit; Refill: 0  - gabapentin (NEURONTIN) 300 MG capsule; Take 1 capsule by mouth 2 times daily for 180 days. Intended supply: 90 days  Dispense: 180 capsule; Refill: 1    3. Chronic right shoulder pain    - methylPREDNISolone (MEDROL DOSEPACK) 4 MG tablet; Take by mouth. Dispense: 1 kit; Refill: 0    4. Positive FIT (fecal immunochemical test)  Referral completed for colonsocopy   - Karina Tolbert MD, Gastroenterology, [address]      Return in about 3 months (around 3/17/2021).     COMMUNICATION:       The best way to find

## 2020-12-29 ENCOUNTER — TELEPHONE (OUTPATIENT)
Dept: GASTROENTEROLOGY | Age: 50
End: 2020-12-29

## 2021-01-05 NOTE — TELEPHONE ENCOUNTER
2nd attempt; called and LVM for pt regarding colon screen        3rd attempt; mailed colon screen letter to pt.

## 2021-03-17 ENCOUNTER — OFFICE VISIT (OUTPATIENT)
Dept: FAMILY MEDICINE CLINIC | Age: 51
End: 2021-03-17

## 2021-03-17 VITALS
DIASTOLIC BLOOD PRESSURE: 82 MMHG | BODY MASS INDEX: 22.63 KG/M2 | TEMPERATURE: 98 F | OXYGEN SATURATION: 98 % | SYSTOLIC BLOOD PRESSURE: 116 MMHG | WEIGHT: 136 LBS | HEART RATE: 82 BPM

## 2021-03-17 DIAGNOSIS — M25.511 CHRONIC RIGHT SHOULDER PAIN: Primary | ICD-10-CM

## 2021-03-17 DIAGNOSIS — M25.50 POLYARTHRALGIA: ICD-10-CM

## 2021-03-17 DIAGNOSIS — R10.31 RIGHT INGUINAL PAIN: ICD-10-CM

## 2021-03-17 DIAGNOSIS — M62.838 CERVICAL PARASPINAL MUSCLE SPASM: ICD-10-CM

## 2021-03-17 DIAGNOSIS — Z13.31 POSITIVE DEPRESSION SCREENING: ICD-10-CM

## 2021-03-17 DIAGNOSIS — G89.29 CHRONIC RIGHT SHOULDER PAIN: Primary | ICD-10-CM

## 2021-03-17 PROCEDURE — G8431 POS CLIN DEPRES SCRN F/U DOC: HCPCS | Performed by: NURSE PRACTITIONER

## 2021-03-17 PROCEDURE — 20605 DRAIN/INJ JOINT/BURSA W/O US: CPT | Performed by: NURSE PRACTITIONER

## 2021-03-17 PROCEDURE — 99214 OFFICE O/P EST MOD 30 MIN: CPT | Performed by: NURSE PRACTITIONER

## 2021-03-17 RX ORDER — CYCLOBENZAPRINE HCL 5 MG
5 TABLET ORAL 2 TIMES DAILY PRN
Qty: 60 TABLET | Refills: 0 | Status: SHIPPED | OUTPATIENT
Start: 2021-03-17 | End: 2021-04-16

## 2021-03-17 RX ORDER — MELOXICAM 15 MG/1
15 TABLET ORAL DAILY
Qty: 30 TABLET | Refills: 0 | Status: SHIPPED | OUTPATIENT
Start: 2021-03-17 | End: 2021-04-15 | Stop reason: SDUPTHER

## 2021-03-17 ASSESSMENT — ENCOUNTER SYMPTOMS
CONSTIPATION: 0
SHORTNESS OF BREATH: 0
DIARRHEA: 0
RHINORRHEA: 0

## 2021-03-17 ASSESSMENT — PATIENT HEALTH QUESTIONNAIRE - PHQ9
10. IF YOU CHECKED OFF ANY PROBLEMS, HOW DIFFICULT HAVE THESE PROBLEMS MADE IT FOR YOU TO DO YOUR WORK, TAKE CARE OF THINGS AT HOME, OR GET ALONG WITH OTHER PEOPLE: 1
3. TROUBLE FALLING OR STAYING ASLEEP: 2
SUM OF ALL RESPONSES TO PHQ QUESTIONS 1-9: 10
7. TROUBLE CONCENTRATING ON THINGS, SUCH AS READING THE NEWSPAPER OR WATCHING TELEVISION: 1
2. FEELING DOWN, DEPRESSED OR HOPELESS: 2
9. THOUGHTS THAT YOU WOULD BE BETTER OFF DEAD, OR OF HURTING YOURSELF: 0
1. LITTLE INTEREST OR PLEASURE IN DOING THINGS: 1
8. MOVING OR SPEAKING SO SLOWLY THAT OTHER PEOPLE COULD HAVE NOTICED. OR THE OPPOSITE, BEING SO FIGETY OR RESTLESS THAT YOU HAVE BEEN MOVING AROUND A LOT MORE THAN USUAL: 1
SUM OF ALL RESPONSES TO PHQ9 QUESTIONS 1 & 2: 3

## 2021-03-17 ASSESSMENT — COLUMBIA-SUICIDE SEVERITY RATING SCALE - C-SSRS: 1. WITHIN THE PAST MONTH, HAVE YOU WISHED YOU WERE DEAD OR WISHED YOU COULD GO TO SLEEP AND NOT WAKE UP?: NO

## 2021-03-17 NOTE — PROGRESS NOTES
Musculoskeletal: Negative for gait problem. Neurological: Positive for tingling. Negative for dizziness. Psychiatric/Behavioral: Negative for confusion. The patient is not nervous/anxious. HISTORY OF PRESENT ILLNESS     Arm Pain   There was no injury mechanism. The pain is present in the right clavicle and right shoulder. The quality of the pain is described as aching. The pain radiates to the right arm. The pain is at a severity of 5/10. The pain is moderate. The pain has been fluctuating (worse with certain activities and sleeping) since the incident. Associated symptoms include muscle weakness and tingling. The symptoms are aggravated by movement and lifting. She has tried acetaminophen, ice, heat and rest for the symptoms. The treatment provided mild relief. Groin Pain  The current episode started more than 1 month ago. The problem occurs intermittently. The problem has been gradually worsening. The pain is moderate. The problem affects the right side. She is not pregnant. Pertinent negatives include no constipation, diarrhea, dysuria or frequency. Patient has had chronic pain, for which she has seen pain management - however her new insurance does not start until June, and patient is attempting minimal out of pocket expense until that time, unsure if they will backdate procedure, as MRI is highly recommended for arm. PHYSICAL EXAM:     /82   Pulse 82   Temp 98 °F (36.7 °C) (Temporal)   Wt 136 lb (61.7 kg)   LMP  (LMP Unknown) Comment: Dos not know LMS before hysterectomy   SpO2 98%   BMI 22.63 kg/m²      Physical Exam  Vitals signs reviewed. Constitutional:       Appearance: Normal appearance. She is not ill-appearing. Cardiovascular:      Rate and Rhythm: Normal rate and regular rhythm. Heart sounds: No murmur. No friction rub. No gallop. Pulmonary:      Effort: Pulmonary effort is normal. No respiratory distress. Breath sounds: No wheezing.    Abdominal: 1. Right shoulder pain  -     cyclobenzaprine (FLEXERIL) 5 MG tablet; Take 1 tablet by mouth 2 times daily as needed for Muscle spasms, Disp-60 tablet, R-0Normal  -      Joint injection given    - instructions given     2. Right groin pain  -     meloxicam (MOBIC) 15 MG tablet; Take 1 tablet by mouth daily, Disp-30 tablet, R-0Normal    3. Polyarthralgia  -     meloxicam (MOBIC) 15 MG tablet; Take 1 tablet by mouth daily, Disp-30 tablet, R-0Normal  4. Positive depression screening  -     Positive Screen for Clinical Depression with a Documented Follow-up Plan   5. Cervical paraspinal muscle spasm    -     cyclobenzaprine (FLEXERIL) 5 MG tablet; Take 1 tablet by mouth 2 times daily as needed for Muscle spasms, Disp-60 tablet, R-0 Normal          Return in about 4 weeks (around 4/14/2021) for symptom check and lab review, [employer] Refill and weight check. COMMUNICATION:       On this date 3/17/2021 I have spent 30 minutes reviewing previous notes, test results and face to face with the patient discussing the diagnosis and importance of compliance with the treatment plan as well as documenting on the day of the visit. The best way to find yourself is to lose yourself in the service of others - [address] [place] [address]. [address] [place] [address]@"[employer]. [employer]  Office: [phone]     An electronic signature was used to authenticate this note.   Signed by SENA Carrasquillo on 3/22/2021 at 1:11 PM

## 2021-03-22 RX ORDER — METHYLPREDNISOLONE ACETATE 40 MG/ML
40 INJECTION, SUSPENSION INTRA-ARTICULAR; INTRALESIONAL; INTRAMUSCULAR; SOFT TISSUE ONCE
Status: SHIPPED | OUTPATIENT
Start: 2021-03-22

## 2021-03-22 RX ORDER — LIDOCAINE HYDROCHLORIDE 10 MG/ML
3 INJECTION, SOLUTION EPIDURAL; INFILTRATION; INTRACAUDAL; PERINEURAL ONCE
Status: SHIPPED | OUTPATIENT
Start: 2021-03-22

## 2021-04-15 ENCOUNTER — OFFICE VISIT (OUTPATIENT)
Dept: FAMILY MEDICINE CLINIC | Age: 51
End: 2021-04-15

## 2021-04-15 VITALS
BODY MASS INDEX: 23.13 KG/M2 | OXYGEN SATURATION: 98 % | WEIGHT: 139 LBS | DIASTOLIC BLOOD PRESSURE: 80 MMHG | TEMPERATURE: 98 F | HEART RATE: 81 BPM | SYSTOLIC BLOOD PRESSURE: 120 MMHG

## 2021-04-15 DIAGNOSIS — M25.511 RIGHT SHOULDER PAIN, UNSPECIFIED CHRONICITY: Primary | ICD-10-CM

## 2021-04-15 DIAGNOSIS — M25.50 POLYARTHRALGIA: ICD-10-CM

## 2021-04-15 DIAGNOSIS — Z12.31 ENCOUNTER FOR SCREENING MAMMOGRAM FOR MALIGNANT NEOPLASM OF BREAST: ICD-10-CM

## 2021-04-15 PROCEDURE — 99213 OFFICE O/P EST LOW 20 MIN: CPT | Performed by: NURSE PRACTITIONER

## 2021-04-15 RX ORDER — MELOXICAM 15 MG/1
15 TABLET ORAL DAILY
Qty: 90 TABLET | Refills: 1 | Status: SHIPPED | OUTPATIENT
Start: 2021-04-15 | End: 2021-07-14

## 2021-04-15 ASSESSMENT — ENCOUNTER SYMPTOMS
SHORTNESS OF BREATH: 0
RHINORRHEA: 0
DIARRHEA: 0
CONSTIPATION: 0
SORE THROAT: 0
COUGH: 0

## 2021-04-15 NOTE — PROGRESS NOTES
[facility]   [address]   [phone]    4/15/2021     CHIEF COMPLAINT:     Kelvin Verma (:  1970) is a 46 y.o. female, here for evaluation of the following chief complaint(s):  Shoulder Pain      REVIEWED INFORMATION      Allergies   Allergen Reactions    Diclofenac Shortness Of Breath, Swelling, Rash and Other (See Comments)     Throat swelling, tremors    Fluoxetine Anaphylaxis, Shortness Of Breath and Swelling     Throat swelling    Prozac [Fluoxetine Hcl] Shortness Of Breath and Swelling     Throat swelling       Current Outpatient Medications   Medication Sig Dispense Refill    meloxicam (MOBIC) 15 MG tablet Take 1 tablet by mouth daily 90 tablet 1    cyclobenzaprine (FLEXERIL) 5 MG tablet Take 1 tablet by mouth 2 times daily as needed for Muscle spasms 60 tablet 0    gabapentin (NEURONTIN) 300 MG capsule Take 1 capsule by mouth 2 times daily for 180 days. Intended supply: 90 days 180 capsule 1    fluticasone (FLONASE) 50 MCG/ACT nasal spray 2 sprays by Each Nostril route daily 1 Bottle 5    acetaminophen (TYLENOL) 325 MG tablet Take 650 mg by mouth every 6 hours as needed for Pain       Current Facility-Administered Medications   Medication Dose Route Frequency Provider Last Rate Last Admin    methylPREDNISolone acetate (DEPO-MEDROL) injection 40 mg  40 mg Intramuscular Once GERARDO Verdugo CNP        lidocaine PF 1 % injection 3 mL  3 mL Intradermal Once GERARDO Verdugo CNP            Patient Care Team:  GERARDO Verdugo CNP as PCP - General (Nurse Practitioner)  GERARDO Verdugo CNP as PCP - [facility] Empaneled Provider    REVIEW OF SYSTEMS:     Review of Systems   Constitutional: Negative for activity change, fatigue and unexpected weight change. HENT: Negative for congestion, ear pain, hearing loss, rhinorrhea and sore throat. Respiratory: Negative for cough and shortness of breath.     Cardiovascular: Negative for chest pain, palpitations and leg swelling. Gastrointestinal: Negative for constipation and diarrhea. Musculoskeletal: Positive for arthralgias ( right shoulder pain) and stiffness. Negative for gait problem. Neurological: Negative for dizziness, tingling, weakness, numbness and headaches. Psychiatric/Behavioral: Negative for confusion. The patient is not nervous/anxious. HISTORY OF PRESENT ILLNESS     Had episode of piercing pain on right side of head for a day and half at beginning of the month, took some gabapentin, heat, and rested. Pain was relieved and hasn't had it since. Getting insurance in 6/2021-will be able to complete more thorough work up once insurance is active. Shoulder Pain   The pain is present in the right shoulder. This is a chronic problem. The current episode started more than 1 year ago. The problem occurs constantly. The problem has been gradually improving. The quality of the pain is described as aching. The pain is at a severity of 4/10. The pain is moderate. Associated symptoms include stiffness. Pertinent negatives include no joint locking, limited range of motion, numbness or tingling. Associated symptoms comments: Has snapping occasionally. The symptoms are aggravated by activity. She has tried rest, NSAIDS and heat (steroid injection) for the symptoms. The treatment provided moderate relief. PHYSICAL EXAM:     /80   Pulse 81   Temp 98 °F (36.7 °C) (Temporal)   Wt 139 lb (63 kg)   LMP  (LMP Unknown) Comment: Dos not know LMS before hysterectomy   SpO2 98%   BMI 23.13 kg/m²      Physical Exam  Vitals signs reviewed. Constitutional:       Appearance: Normal appearance. She is not ill-appearing. Cardiovascular:      Rate and Rhythm: Normal rate and regular rhythm. Heart sounds: No murmur. No friction rub. No gallop. Pulmonary:      Effort: Pulmonary effort is normal. No respiratory distress. Breath sounds: No wheezing. Abdominal:      General: Bowel sounds are normal.      Palpations: Abdomen is soft. Tenderness: There is no abdominal tenderness. Musculoskeletal:      Right shoulder: She exhibits decreased range of motion, tenderness and pain. Arms:       Right lower leg: No edema. Left lower leg: No edema. Skin:     General: Skin is warm. Findings: No erythema, lesion or rash. Neurological:      Mental Status: She is alert. Psychiatric:         Mood and Affect: Mood normal.         Behavior: Behavior is cooperative. PROCEDURE/ IN OFFICE TESTING     No in office testing or procedures completed during today's office visit. ASSESSMENT/PLAN/ FOLLOWUP:     1. Right shoulder pain, unspecified chronicity  Complete prior to next appointment  -     XR SHOULDER RIGHT (MIN 2 VIEWS); Future  2. Polyarthralgia  -healthy diet as discussed  -daily exercise with in physical limitations  -     meloxicam (MOBIC) 15 MG tablet; Take 1 tablet by mouth daily, Disp-90 tablet, R-1Normal  3. Encounter for screening mammogram for malignant neoplasm of breast  -complete testing as ordered  -     CARL DIGITAL SCREEN W OR WO CAD BILATERAL; Future    Patient is currently waiting for insurance to take place at that point we will start ordering MRI, physical therapy, and further intervention. She is currently been working with neurosurgery. But due to insurance issues she has not been able to follow through with any of the testing required for further work-up. Return in about 2 months (around 6/15/2021) for symptom check and lab review. COMMUNICATION:       On this date 4/15/2021 I have spent 20 minutes reviewing previous notes, test results and face to face with the patient discussing the diagnosis and importance of compliance with the treatment plan as well as documenting on the day of the visit. The best way to find yourself is to lose yourself in the service of others - [address] [place] [address]. [facility]   [email or web address]  Office: [phone]     An electronic signature was used to authenticate this note.   Signed by SENA Corrales on 4/15/2021 at 5:20 PM

## 2021-06-01 ENCOUNTER — HOSPITAL ENCOUNTER (OUTPATIENT)
Dept: GENERAL RADIOLOGY | Facility: CLINIC | Age: 51
Discharge: HOME | End: 2021-06-03
Payer: MEDICARE

## 2021-06-01 DIAGNOSIS — M25.511 RIGHT SHOULDER PAIN, UNSPECIFIED CHRONICITY: ICD-10-CM

## 2021-06-01 PROCEDURE — 73030 X-RAY EXAM OF SHOULDER: CPT

## 2021-06-10 DIAGNOSIS — M25.511 CHRONIC RIGHT SHOULDER PAIN: Primary | ICD-10-CM

## 2021-06-10 DIAGNOSIS — G89.29 CHRONIC RIGHT SHOULDER PAIN: Primary | ICD-10-CM

## 2021-06-14 ENCOUNTER — HOSPITAL ENCOUNTER (OUTPATIENT)
Dept: MAMMOGRAPHY | Age: 51
Discharge: HOME | End: 2021-06-16
Payer: MEDICARE

## 2021-06-14 DIAGNOSIS — M62.838 CERVICAL PARASPINAL MUSCLE SPASM: ICD-10-CM

## 2021-06-14 DIAGNOSIS — Z98.1 S/P CERVICAL SPINAL FUSION: ICD-10-CM

## 2021-06-14 DIAGNOSIS — Z12.31 ENCOUNTER FOR SCREENING MAMMOGRAM FOR MALIGNANT NEOPLASM OF BREAST: ICD-10-CM

## 2021-06-14 PROCEDURE — 77063 BREAST TOMOSYNTHESIS BI: CPT

## 2021-06-14 RX ORDER — GABAPENTIN 300 MG/1
300 CAPSULE ORAL 2 TIMES DAILY
Qty: 180 CAPSULE | Refills: 1 | Status: SHIPPED | OUTPATIENT
Start: 2021-06-14 | End: 2021-09-12

## 2021-06-14 NOTE — TELEPHONE ENCOUNTER
LOV 4/15/21  LRF 12/17/20  RTO Scheduled    Health Maintenance   Topic Date Due    Hepatitis C screen  Never done    COVID-19 Vaccine (1) Never done    Breast cancer screen  09/27/2019    Annual Wellness Visit (AWV)  Never done    Shingles Vaccine (1 of 2) 07/06/2021 (Originally 2/12/2020)    Colon Cancer Screen FIT/FOBT  10/06/2021    Lipid screen  08/14/2023    DTaP/Tdap/Td vaccine (2 - Td or Tdap) 02/05/2029    Pneumococcal 0-64 years Vaccine (2 of 2) 02/12/2035    Flu vaccine  Completed    HIV screen  Completed    Hepatitis A vaccine  Aged Out    Hepatitis B vaccine  Aged Out    Hib vaccine  Aged Out    Meningococcal (ACWY) vaccine  Aged Out             (applicable per patient's age: Cancer Screenings, Depression Screening, Fall Risk Screening, Immunizations)    LDL Cholesterol (mg/dL)   Date Value   08/14/2018 83     AST (U/L)   Date Value   09/25/2020 19     ALT (U/L)   Date Value   09/25/2020 15     BUN (mg/dL)   Date Value   09/25/2020 13      (goal A1C is < 7)   (goal LDL is <100) need 30-50% reduction from baseline     BP Readings from Last 3 Encounters:   04/15/21 120/80   03/17/21 116/82   12/17/20 112/74    (goal /80)      All Future Testing planned in [facility]:  Lab Frequency Next Occurrence   MRI LUMBAR SPINE WO CONTRAST Once 09/14/2021   MRI LUMBAR SPINE WO CONTRAST Once 09/14/2021   CARL DIGITAL SCREEN W OR WO CAD BILATERAL Once 04/15/2022   MRI SHOULDER RIGHT WO CONTRAST Once 06/10/2021       Next Visit Date:  Future Appointments   Date Time Provider Latasha Ugarte   6/14/2021  1:45 PM STA DIAG MAMMO RM 3 STAZ MAMMO STA Radiolog   6/16/2021  2:30 PM GERARDO Zuniga - CNP [facility]   6/30/2021  8:00 AM STA [place] OP MRI STAZ [place] MRI STA [place]            Patient Active Problem List:     POTS (postural orthostatic tachycardia syndrome)     Polyarthralgia     Ocular migraine     Tobacco use disorder     History of hysterectomy for benign disease     Family history of heart disease     S/P cervical spinal fusion     Chronic cervical radiculopathy     Cervical spinal stenosis

## 2021-06-15 DIAGNOSIS — R92.8 ABNORMAL MAMMOGRAM: Primary | ICD-10-CM

## 2021-06-16 ENCOUNTER — OFFICE VISIT (OUTPATIENT)
Dept: FAMILY MEDICINE CLINIC | Age: 51
End: 2021-06-16

## 2021-06-16 VITALS
OXYGEN SATURATION: 98 % | HEART RATE: 84 BPM | TEMPERATURE: 97 F | WEIGHT: 139 LBS | SYSTOLIC BLOOD PRESSURE: 116 MMHG | BODY MASS INDEX: 23.13 KG/M2 | DIASTOLIC BLOOD PRESSURE: 70 MMHG

## 2021-06-16 DIAGNOSIS — M25.511 RIGHT SHOULDER PAIN, UNSPECIFIED CHRONICITY: Primary | ICD-10-CM

## 2021-06-16 DIAGNOSIS — G95.9 MYELOPATHY (HCC): ICD-10-CM

## 2021-06-16 DIAGNOSIS — R92.8 ABNORMAL MAMMOGRAM: ICD-10-CM

## 2021-06-16 DIAGNOSIS — Z13.31 POSITIVE DEPRESSION SCREENING: ICD-10-CM

## 2021-06-16 PROCEDURE — G8427 DOCREV CUR MEDS BY ELIG CLIN: HCPCS | Performed by: NURSE PRACTITIONER

## 2021-06-16 PROCEDURE — G8420 CALC BMI NORM PARAMETERS: HCPCS | Performed by: NURSE PRACTITIONER

## 2021-06-16 PROCEDURE — 99214 OFFICE O/P EST MOD 30 MIN: CPT | Performed by: NURSE PRACTITIONER

## 2021-06-16 PROCEDURE — 3017F COLORECTAL CA SCREEN DOC REV: CPT | Performed by: NURSE PRACTITIONER

## 2021-06-16 PROCEDURE — 4004F PT TOBACCO SCREEN RCVD TLK: CPT | Performed by: NURSE PRACTITIONER

## 2021-06-16 RX ORDER — CYCLOBENZAPRINE HCL 5 MG
5 TABLET ORAL 2 TIMES DAILY PRN
COMMUNITY

## 2021-06-16 NOTE — PROGRESS NOTES
[address] [facility]   [address]   [phone]    2021     CHIEF COMPLAINT:     Jessica Hull (:  1970) is a 46 y.o. female, here for evaluation of the following chief complaint(s):  Shoulder Pain      REVIEWED INFORMATION      Allergies   Allergen Reactions    Diclofenac Shortness Of Breath, Swelling, Rash and Other (See Comments)     Throat swelling, tremors    Fluoxetine Anaphylaxis, Shortness Of Breath and Swelling     Throat swelling    Prozac [Fluoxetine Hcl] Shortness Of Breath and Swelling     Throat swelling       Current Outpatient Medications   Medication Sig Dispense Refill    cyclobenzaprine (FLEXERIL) 5 MG tablet Take 5 mg by mouth 2 times daily as needed for Muscle spasms      gabapentin (NEURONTIN) 300 MG capsule Take 1 capsule by mouth 2 times daily for 90 days. 180 capsule 1    meloxicam (MOBIC) 15 MG tablet Take 1 tablet by mouth daily 90 tablet 1    fluticasone (FLONASE) 50 MCG/ACT nasal spray 2 sprays by Each Nostril route daily 1 Bottle 5    acetaminophen (TYLENOL) 325 MG tablet Take 650 mg by mouth every 6 hours as needed for Pain       Current Facility-Administered Medications   Medication Dose Route Frequency Provider Last Rate Last Admin    methylPREDNISolone acetate (DEPO-MEDROL) injection 40 mg  40 mg Intramuscular Once GERARDO Lay CNP        lidocaine PF 1 % injection 3 mL  3 mL Intradermal Once GERARDO Lay CNP            Patient Care Team:  GERARDO Lay CNP as PCP - General (Nurse Practitioner)  GERARDO Lay CNP as PCP - [facility] R[facility] Mercer Provider    REVIEW OF SYSTEMS:     Review of Systems   Constitutional: Negative for activity change, fatigue and unexpected weight change. HENT: Negative for congestion, ear pain, hearing loss, rhinorrhea and sore throat. Respiratory: Negative for cough and shortness of breath.     Cardiovascular: Negative for chest pain, palpitations and leg swelling. Gastrointestinal: Negative for constipation and diarrhea. Musculoskeletal: Positive for stiffness. Negative for arthralgias and gait problem. Continued right shoulder pain - limited ROM is improved with steroid injection, however continues to have pain. Neurological: Negative for dizziness, weakness and headaches. Psychiatric/Behavioral: Negative for confusion. The patient is not nervous/anxious. HISTORY OF PRESENT ILLNESS     Shoulder Pain   The pain is present in the right shoulder and right arm. This is a chronic (several years- worsened ROM with the last year, however due to insurance patient has been limited on testing as well as medical intervention due to cost. ) problem. The current episode started more than 1 year ago. The problem occurs constantly. The quality of the pain is described as aching. The pain is at a severity of 6/10. The pain is moderate. Associated symptoms include an inability to bear weight, a limited range of motion and stiffness. The symptoms are aggravated by activity. She has tried OTC pain meds, NSAIDS, rest and acetaminophen (steroid injection -offered some relief) for the symptoms. The treatment provided mild relief. Her past medical history is significant for osteoarthritis. I have been working with patient over the last year to help, relieve discomfort in right shoulder, injection therapy was used on her recently and did improve her ROM, but no improvement on strengthening - Due to chronicity of pain - it is more than likely rotator cuff involvement. Xray did not show evidence of fracture or acute injury. In addition, patient underwent recent mammogram, -noted that she has abnormality of right breast - diagnostic has been ordered. Patient has been encouraged to completed this prior to her move to [place] within the next month.        PHYSICAL EXAM:     /70   Pulse 84   Temp 97 °F (36.1 °C) (Temporal)   Wt 139 lb (63 kg)   LMP (LMP Unknown) Comment: Dos not know LMS before hysterectomy   SpO2 98%   BMI 23.13 kg/m²      Physical Exam  Vitals reviewed. Constitutional:       Appearance: Normal appearance. She is not ill-appearing. Cardiovascular:      Rate and Rhythm: Normal rate and regular rhythm. Heart sounds: No murmur heard. No friction rub. No gallop. Pulmonary:      Effort: Pulmonary effort is normal. No respiratory distress. Breath sounds: No wheezing. Abdominal:      General: Bowel sounds are normal.      Palpations: Abdomen is soft. Tenderness: There is no abdominal tenderness. Musculoskeletal:      Right shoulder: Crepitus present. No swelling, deformity or laceration. Decreased range of motion. Decreased strength. Right hip: Tenderness present. Normal range of motion. Normal strength. Right lower leg: No edema. Left lower leg: No edema. Skin:     General: Skin is warm. Findings: No erythema, lesion or rash. Neurological:      Mental Status: She is alert. Psychiatric:         Mood and Affect: Mood normal.         Behavior: Behavior is cooperative. PROCEDURE/ IN OFFICE TESTING     No in office testing or procedures completed during today's office visit. ASSESSMENT/PLAN/ FOLLOWUP:     1. Right shoulder pain, unspecified chronicity  MRI ordered to be completed  Continue with medication as ordered for pain and discomfort  Physical therapy once your move to [place] has been establised  Referral to ortho completed  2. Myelopathy (HCC)  Stable, Monitored    3. Positive depression screening - previous screening   Patient is currently stable  4. Abnormal mammogram  Complete diagnostic testing has ordered      Return if symptoms worsen or fail to improve or schedule sooner if you have not moved to [place]. COMMUNICATION:             The best way to find yourself is to lose yourself in the service of others - [address] [place] [address].  Annia BALDERRAMAN-CNP  [employer] [address] [facility]   [email or web address]  Office: [phone]     An electronic signature was used to authenticate this note.   Signed by GERARDO Simmons CNP, APRN-CNP on 7/6/2021 at 10:59 PM

## 2021-06-28 ENCOUNTER — HOSPITAL ENCOUNTER (OUTPATIENT)
Dept: ULTRASOUND IMAGING | Age: 51
Discharge: HOME | End: 2021-06-30
Payer: MEDICARE

## 2021-06-28 ENCOUNTER — HOSPITAL ENCOUNTER (OUTPATIENT)
Dept: MAMMOGRAPHY | Age: 51
Discharge: HOME | End: 2021-06-30
Payer: MEDICARE

## 2021-06-28 DIAGNOSIS — R92.8 ABNORMAL MAMMOGRAM: ICD-10-CM

## 2021-06-28 PROCEDURE — G0279 TOMOSYNTHESIS, MAMMO: HCPCS

## 2021-06-28 PROCEDURE — 76642 ULTRASOUND BREAST LIMITED: CPT

## 2021-06-30 ENCOUNTER — HOSPITAL ENCOUNTER (OUTPATIENT)
Dept: MRI IMAGING | Facility: CLINIC | Age: 51
Discharge: HOME | End: 2021-07-02
Payer: MEDICARE

## 2021-06-30 DIAGNOSIS — M25.511 CHRONIC RIGHT SHOULDER PAIN: ICD-10-CM

## 2021-06-30 DIAGNOSIS — G89.29 CHRONIC RIGHT SHOULDER PAIN: ICD-10-CM

## 2021-06-30 PROCEDURE — 73221 MRI JOINT UPR EXTREM W/O DYE: CPT

## 2021-07-06 PROBLEM — G95.9 MYELOPATHY (HCC): Status: ACTIVE | Noted: 2021-07-06

## 2021-07-06 ASSESSMENT — ENCOUNTER SYMPTOMS
SORE THROAT: 0
RHINORRHEA: 0
DIARRHEA: 0
SHORTNESS OF BREATH: 0
COUGH: 0
CONSTIPATION: 0

## 2022-05-16 NOTE — PROGRESS NOTES
Refill request from pharmacy for:  famotidine (PEPCID) 40 MG tablet 90 tablet 0 2/14/2022     Last office visit: 03/10/22  Upcoming visit: 03/14/23    Medication has been sent to pharmacy per protocol.   Subjective:      Patient ID: Michael Jain is a 52 y.o. female. Visit Information    Have you changed or started any medications since your last visit including any over-the-counter medicines, vitamins, or herbal medicines? no   Are you having any side effects from any of your medications? -  no  Have you stopped taking any of your medications? Is so, why? -  no    Have you seen any other physician or provider since your last visit? Neurologist  Have you had any other diagnostic tests since your last visit? EMG  Have you been seen in the emergency room and/or had an admission to a hospital since we last saw you? No  Have you had your routine dental cleaning in the past 6 months? no    Have you activated your [employer] account? If not, what are your barriers? Yes     Patient Care Team:  GERARDO Whalen CNP as PCP - General (Family Medicine)  GERARDO Whalen CNP as PCP - [facility]    Medical History Review  Past Medical, Family, and Social History reviewed and does contribute to the patient presenting condition    Health Maintenance   Topic Date Due    Lipid screen  08/14/2023    DTaP/Tdap/Td vaccine (2 - Td) 02/05/2029    Flu vaccine  Completed    Pneumococcal 0-64 years Vaccine  Completed    HIV screen  Completed     /76   Pulse 84   Temp 98.2 °F (36.8 °C) (Tympanic)   Resp 16   Wt 121 lb (54.9 kg)   BMI 20.14 kg/m²      PHQ Scores 2/5/2019 8/14/2018   PHQ2 Score 0 0   PHQ9 Score 0 0     Interpretation of Total Score DepressionSeverity: 1-4 = Minimal depression, 5-9 = Mild depression, 10-14 = Moderate depression, 15-19 = Moderately severe depression, 20-27 = Severe depression    Current Outpatient Medications   Medication Sig Dispense Refill    baclofen (LIORESAL) 10 MG tablet Take 1 tablet by mouth 3 times daily 30 tablet 1    gabapentin (NEURONTIN) 100 MG capsule Take 1 capsule by mouth nightly for 30 days.  Intended supply: 30 days 30 capsule 0    cyclobenzaprine fatigue, fever and unexpected weight change. HENT: Negative for congestion, ear pain, postnasal drip, rhinorrhea and sore throat. Eyes: Negative for photophobia, pain and visual disturbance. Respiratory: Negative for cough, chest tightness, shortness of breath and wheezing. Cardiovascular: Negative for chest pain and palpitations. Gastrointestinal: Negative for abdominal distention, abdominal pain, blood in stool, constipation, diarrhea, nausea and vomiting. Endocrine: Negative for polydipsia and polyuria. Genitourinary: Positive for dysuria (mild-improved today) and frequency. Negative for hematuria and urgency. Musculoskeletal: Positive for back pain and neck pain (right shoulder pain). Negative for gait problem, myalgias and neck stiffness. Skin: Negative for rash and wound. Allergic/Immunologic: Negative for environmental allergies and food allergies. Diclofenac  Prozac   Neurological: Positive for numbness (numbness and tingling right arm/hand) and headaches. Negative for dizziness, seizures, syncope and weakness. Hematological: Negative for adenopathy. Psychiatric/Behavioral: Positive for sleep disturbance. Negative for dysphoric mood and suicidal ideas. The patient is not nervous/anxious. Objective:   Physical Exam   Constitutional: She is oriented to person, place, and time. Vital signs are normal. She appears well-developed and well-nourished. Non-toxic appearance. She does not appear ill. No distress. HENT:   Head: Normocephalic and atraumatic. Right Ear: Hearing, tympanic membrane, external ear and ear canal normal.   Left Ear: Hearing, tympanic membrane, external ear and ear canal normal.   Nose: Nose normal.   Mouth/Throat: Uvula is midline and oropharynx is clear and moist. No oropharyngeal exudate or posterior oropharyngeal erythema. Eyes: Pupils are equal, round, and reactive to light.  Conjunctivae, EOM and lids are normal. Right eye exhibits no discharge. Left eye exhibits no discharge. No scleral icterus. Neck: Trachea normal. Neck supple. Muscular tenderness present. No neck rigidity. Decreased range of motion present. No tracheal deviation and no erythema present. Cardiovascular: Normal rate, regular rhythm, S1 normal, S2 normal, normal heart sounds and intact distal pulses. No murmur heard. Pulses:       Carotid pulses are 2+ on the right side, and 2+ on the left side. Radial pulses are 2+ on the right side, and 2+ on the left side. Dorsalis pedis pulses are 2+ on the right side, and 2+ on the left side. Pulmonary/Chest: Effort normal and breath sounds normal. No accessory muscle usage. No respiratory distress. She has no decreased breath sounds. She has no wheezes. She has no rhonchi. She has no rales. She exhibits no tenderness. Abdominal: Soft. Normal appearance and bowel sounds are normal. She exhibits no distension. There is no tenderness. There is no rigidity, no rebound, no guarding and no CVA tenderness. Musculoskeletal: She exhibits no edema. Right shoulder: She exhibits decreased range of motion, tenderness, pain and decreased strength. Lumbar back: She exhibits decreased range of motion and tenderness. Lymphadenopathy:     She has no cervical adenopathy. Neurological: She is alert and oriented to person, place, and time. She has normal strength and normal reflexes. No cranial nerve deficit or sensory deficit. She exhibits normal muscle tone. She displays no seizure activity. Coordination and gait normal. GCS eye subscore is 4. GCS verbal subscore is 5. GCS motor subscore is 6. Skin: Skin is warm, dry and intact. No rash noted. She is not diaphoretic. No erythema. Psychiatric: She has a normal mood and affect. Her speech is normal and behavior is normal. Judgment and thought content normal. Cognition and memory are normal.   Nursing note and vitals reviewed. Assessment / Plan:     1. Flank pain  Point-of-care urine positive for blood, no leukocytes or nitrates  Urine sent for culture  Obtain KUB to evaluate for possible renal stone  Encouraged to drink plenty of fluids  - POCT Urinalysis no Micro  - XR ABDOMEN (KUB) (SINGLE AP VIEW); Future    2. Hematuria, unspecified type  Point-of-care urine positive for blood, no leukocytes or nitrates  Urine sent for culture  Obtain KUB to evaluate for possible renal stone  Encouraged to drink plenty of fluids  Consider referral to urology pending culture results/x-ray results  - XR ABDOMEN (KUB) (SINGLE AP VIEW); Future  - Urine Culture; Future    3. Cervical paraspinal muscle spasm  Trial baclofen to see if this is more effective than Zanaflex/Flexeril  Refill gabapentin  Maintain appointment later today with neurosurgeon  Continue to monitor symptoms.  - baclofen (LIORESAL) 10 MG tablet; Take 1 tablet by mouth 3 times daily  Dispense: 30 tablet; Refill: 1  - gabapentin (NEURONTIN) 100 MG capsule; Take 1 capsule by mouth nightly for 30 days. Intended supply: 30 days  Dispense: 30 capsule; Refill: 0      Return in about 1 month (around 7/10/2019), or if symptoms worsen or fail to improve, for Routine follow up. Rayna received counseling on the following healthy behaviors: nutrition, exercise and medication adherence  Reviewed prior labs and health maintenance. Continue current medications, diet and exercise. Discussed use, benefit, and side effects of prescribed medications. Barriers to medication compliance addressed. Patient given educational materials - see patient instructions. All patient questions answered. Patient voiced understanding.            Electronically signed by GERARDO Whalen CNP on 6/17/2019 at 9:26 AM

## (undated) DEVICE — SUTURE VCRL SZ 3-0 L18IN ABSRB UD L26MM SH 1/2 CIR J864D

## (undated) DEVICE — STANDARD HYPODERMIC NEEDLE,POLYPROPYLENE HUB: Brand: MONOJECT

## (undated) DEVICE — PACK PROCEDURE SURG LUMBAR SPINE SVMMC

## (undated) DEVICE — EMG TUBE 8229707 NIM TRIVANTAGE 7.0MM ID: Brand: NIM TRIVANTAGE™

## (undated) DEVICE — DRAPE,REIN 53X77,STERILE: Brand: MEDLINE

## (undated) DEVICE — TOTAL TRAY, 16FR 10ML SIL FOLEY, URN: Brand: MEDLINE

## (undated) DEVICE — 3.0MM PRECISION NEURO (MATCH HEAD)

## (undated) DEVICE — GLOVE SURG SZ 8 L12IN FNGR THK79MIL GRN LTX FREE

## (undated) DEVICE — GOWN,AURORA,NONREINFORCED,LARGE: Brand: MEDLINE

## (undated) DEVICE — ABS MED DISTRACTION PIN , 12MM POUCH
Type: IMPLANTABLE DEVICE | Site: SPINE CERVICAL | Status: NON-FUNCTIONAL
Brand: ABS MED DISTRACTION PIN
Removed: 2019-08-29

## (undated) DEVICE — ADHESIVE SKIN CLSR 0.7ML TOP DERMBND ADV

## (undated) DEVICE — DRAPE THYROID

## (undated) DEVICE — COVER,MAYO STAND,STERILE: Brand: MEDLINE

## (undated) DEVICE — COUNTER NDL 40 COUNT HLD 70 FOAM BLK ADH W/ MAG

## (undated) DEVICE — CODMAN® SURGICAL PATTIES 1/2" X 1/2" (1.27CM X 1.27CM): Brand: CODMAN®

## (undated) DEVICE — TUBE LUKENS 20CC 6 1/4": Brand: ALLEGIANCE

## (undated) DEVICE — DRAPE C ARM UNIV W41XL74IN CLR PLAS XR VELC CLSR POLY STRP

## (undated) DEVICE — PATIENT RETURN ELECTRODE, SINGLE-USE, CONTACT QUALITY MONITORING, ADULT, WITH 9FT CORD, FOR PATIENTS WEIGING OVER 33LBS. (15KG): Brand: MEGADYNE

## (undated) DEVICE — CODMAN® SURGICAL PATTIES 1/2" X 3" (1.27CM X 7.62CM): Brand: CODMAN®

## (undated) DEVICE — GLOVE ORANGE PI 7 1/2   MSG9075

## (undated) DEVICE — TUBING, SUCTION, 9/32" X 20', STRAIGHT: Brand: MEDLINE INDUSTRIES, INC.

## (undated) DEVICE — ELECTRODE PT RET AD L9FT HI MOIST COND ADH HYDRGEL CORDED

## (undated) DEVICE — E-Z CLEAN, NON-STICK, PTFE COATED, ELECTROSURGICAL BLADE ELECTRODE, MODIFIED EXTENDED INSULATION, 2.5 INCH (6.35 CM): Brand: MEGADYNE

## (undated) DEVICE — 3M™ IOBAN™ 2 ANTIMICROBIAL INCISE DRAPE 6650EZ: Brand: IOBAN™ 2

## (undated) DEVICE — Z DISCONTINUED APPLICATOR SURG PREP 0.35OZ 2% CHG 70% ISO ALC W/ HI LT

## (undated) DEVICE — GARMENT,MEDLINE,DVT,INT,CALF,MED, GEN2: Brand: MEDLINE

## (undated) DEVICE — POUCH INSTR W6.75XL11.5IN FRST 2 PKT ADH FOR ORTH AND

## (undated) DEVICE — NEEDLE SPNL 18GA L3.5IN W/ QNCKE SHARPER BVL DURA CLICK

## (undated) DEVICE — TOWEL,OR,DSP,ST,NATURAL,DLX,4/PK,20PK/CS: Brand: MEDLINE

## (undated) DEVICE — CONNECTOR TBNG WHT PLAS SUCT STR 5IN1 LTWT W/ M CONN

## (undated) DEVICE — SUTURE MCRYL SZ 4-0 L18IN ABSRB UD L16MM PC-3 3/8 CIR PRIM Y845G

## (undated) DEVICE — SPONGE,PEANUT,XRAY,ST,SM,3/8",5/CARD: Brand: MEDLINE INDUSTRIES, INC.

## (undated) DEVICE — PAD,NON-ADHERENT,3X8,STERILE,LF,1/PK: Brand: MEDLINE

## (undated) DEVICE — Device

## (undated) DEVICE — CONTROL SYRINGE LUER-LOCK TIP: Brand: MONOJECT

## (undated) DEVICE — GLOVE,EXAM,NITRILE,RESTORE,OAT SENSE,L: Brand: MEDLINE

## (undated) DEVICE — GAUZE,SPONGE,FLUFF,6"X6.75",STRL,5/TRAY: Brand: MEDLINE

## (undated) DEVICE — MARKER,SKIN,WI/RULER AND LABELS: Brand: MEDLINE